# Patient Record
Sex: FEMALE | Race: WHITE | NOT HISPANIC OR LATINO | Employment: FULL TIME | URBAN - METROPOLITAN AREA
[De-identification: names, ages, dates, MRNs, and addresses within clinical notes are randomized per-mention and may not be internally consistent; named-entity substitution may affect disease eponyms.]

---

## 2017-07-25 LAB
EXTERNAL ABO GROUPING: NORMAL
EXTERNAL ANTIBODY SCREEN: NEGATIVE
EXTERNAL HEPATITIS B SURFACE ANTIGEN: NEGATIVE
EXTERNAL RH FACTOR: POSITIVE
EXTERNAL RUBELLA QUALITATIVE: NORMAL
EXTERNAL SYPHILIS RPR SCREEN: NEGATIVE
EXTERNAL URINE DRUG SCREEN: NEGATIVE
HIV1 P24 AG SERPL QL IA: NEGATIVE

## 2017-12-06 ENCOUNTER — LAB REQUISITION (OUTPATIENT)
Dept: LAB | Facility: HOSPITAL | Age: 27
End: 2017-12-06

## 2017-12-06 DIAGNOSIS — Z00.00 ROUTINE GENERAL MEDICAL EXAMINATION AT A HEALTH CARE FACILITY: ICD-10-CM

## 2017-12-06 DIAGNOSIS — Z34.00 ENCOUNTER FOR SUPERVISION OF NORMAL FIRST PREGNANCY: ICD-10-CM

## 2017-12-06 LAB
EXTERNAL GTT 1 HOUR: 93
GLUCOSE BLDC GLUCOMTR-MCNC: 117 MG/DL

## 2017-12-06 PROCEDURE — 82962 GLUCOSE BLOOD TEST: CPT | Performed by: NURSE PRACTITIONER

## 2017-12-06 PROCEDURE — 36415 COLL VENOUS BLD VENIPUNCTURE: CPT | Performed by: NURSE PRACTITIONER

## 2018-02-06 ENCOUNTER — TRANSCRIBE ORDERS (OUTPATIENT)
Dept: LAB | Facility: HOSPITAL | Age: 28
End: 2018-02-06

## 2018-02-06 ENCOUNTER — LAB (OUTPATIENT)
Dept: LAB | Facility: HOSPITAL | Age: 28
End: 2018-02-06

## 2018-02-06 DIAGNOSIS — Z34.83 PRENATAL CARE, SUBSEQUENT PREGNANCY, THIRD TRIMESTER: Primary | ICD-10-CM

## 2018-02-06 DIAGNOSIS — Z34.83 PRENATAL CARE, SUBSEQUENT PREGNANCY, THIRD TRIMESTER: ICD-10-CM

## 2018-02-06 LAB — EXTERNAL GROUP B STREP ANTIGEN: NEGATIVE

## 2018-02-06 PROCEDURE — 87081 CULTURE SCREEN ONLY: CPT

## 2018-02-08 ENCOUNTER — HOSPITAL ENCOUNTER (OUTPATIENT)
Facility: HOSPITAL | Age: 28
End: 2018-02-08
Attending: OBSTETRICS & GYNECOLOGY | Admitting: OBSTETRICS & GYNECOLOGY

## 2018-02-09 LAB — BACTERIA SPEC AEROBE CULT: NORMAL

## 2018-02-15 ENCOUNTER — HOSPITAL ENCOUNTER (OUTPATIENT)
Dept: LABOR AND DELIVERY | Facility: HOSPITAL | Age: 28
Discharge: HOME | End: 2018-02-15
Attending: OBSTETRICS & GYNECOLOGY | Admitting: OBSTETRICS & GYNECOLOGY

## 2018-02-15 VITALS
SYSTOLIC BLOOD PRESSURE: 133 MMHG | DIASTOLIC BLOOD PRESSURE: 83 MMHG | WEIGHT: 180 LBS | HEART RATE: 86 BPM | TEMPERATURE: 99.2 F | RESPIRATION RATE: 16 BRPM | BODY MASS INDEX: 33.13 KG/M2 | HEIGHT: 62 IN

## 2018-02-15 LAB
ABO GROUP BLD: NORMAL
ALP SERPL-CCNC: 175 U/L (ref 25–100)
ALT SERPL W P-5'-P-CCNC: 17 U/L (ref 7–40)
AST SERPL-CCNC: 17 U/L (ref 0–33)
BILIRUB SERPL-MCNC: 0.5 MG/DL (ref 0.3–1.2)
BLD GP AB SCN SERPL QL: NEGATIVE
CREAT BLD-MCNC: 0.6 MG/DL (ref 0.6–1.3)
DEPRECATED RDW RBC AUTO: 44.3 FL (ref 37–54)
ERYTHROCYTE [DISTWIDTH] IN BLOOD BY AUTOMATED COUNT: 14.2 % (ref 11.3–14.5)
HCT VFR BLD AUTO: 40.4 % (ref 34.5–44)
HGB BLD-MCNC: 13.6 G/DL (ref 11.5–15.5)
LDH SERPL-CCNC: 190 U/L (ref 120–246)
MCH RBC QN AUTO: 29.1 PG (ref 27–31)
MCHC RBC AUTO-ENTMCNC: 33.7 G/DL (ref 32–36)
MCV RBC AUTO: 86.3 FL (ref 80–99)
PLATELET # BLD AUTO: 238 10*3/MM3 (ref 150–450)
PMV BLD AUTO: 10.4 FL (ref 6–12)
RBC # BLD AUTO: 4.68 10*6/MM3 (ref 3.89–5.14)
RH BLD: POSITIVE
URATE SERPL-MCNC: 4.4 MG/DL (ref 3.1–7.8)
WBC NRBC COR # BLD: 11.6 10*3/MM3 (ref 3.5–10.8)

## 2018-02-15 PROCEDURE — 96372 THER/PROPH/DIAG INJ SC/IM: CPT

## 2018-02-15 PROCEDURE — G0378 HOSPITAL OBSERVATION PER HR: HCPCS

## 2018-02-15 PROCEDURE — 59412 ANTEPARTUM MANIPULATION: CPT

## 2018-02-15 PROCEDURE — 84460 ALANINE AMINO (ALT) (SGPT): CPT | Performed by: OBSTETRICS & GYNECOLOGY

## 2018-02-15 PROCEDURE — 86850 RBC ANTIBODY SCREEN: CPT | Performed by: OBSTETRICS & GYNECOLOGY

## 2018-02-15 PROCEDURE — 82247 BILIRUBIN TOTAL: CPT | Performed by: OBSTETRICS & GYNECOLOGY

## 2018-02-15 PROCEDURE — 25010000002 TERBUTALINE PER 1 MG: Performed by: OBSTETRICS & GYNECOLOGY

## 2018-02-15 PROCEDURE — 84450 TRANSFERASE (AST) (SGOT): CPT | Performed by: OBSTETRICS & GYNECOLOGY

## 2018-02-15 PROCEDURE — 82565 ASSAY OF CREATININE: CPT | Performed by: OBSTETRICS & GYNECOLOGY

## 2018-02-15 PROCEDURE — 86901 BLOOD TYPING SEROLOGIC RH(D): CPT | Performed by: OBSTETRICS & GYNECOLOGY

## 2018-02-15 PROCEDURE — 59025 FETAL NON-STRESS TEST: CPT

## 2018-02-15 PROCEDURE — 84075 ASSAY ALKALINE PHOSPHATASE: CPT | Performed by: OBSTETRICS & GYNECOLOGY

## 2018-02-15 PROCEDURE — 85027 COMPLETE CBC AUTOMATED: CPT | Performed by: OBSTETRICS & GYNECOLOGY

## 2018-02-15 PROCEDURE — 84550 ASSAY OF BLOOD/URIC ACID: CPT | Performed by: OBSTETRICS & GYNECOLOGY

## 2018-02-15 PROCEDURE — 83615 LACTATE (LD) (LDH) ENZYME: CPT | Performed by: OBSTETRICS & GYNECOLOGY

## 2018-02-15 PROCEDURE — 86900 BLOOD TYPING SEROLOGIC ABO: CPT | Performed by: OBSTETRICS & GYNECOLOGY

## 2018-02-15 RX ORDER — TERBUTALINE SULFATE 1 MG/ML
0.25 INJECTION, SOLUTION SUBCUTANEOUS ONCE
Status: COMPLETED | OUTPATIENT
Start: 2018-02-15 | End: 2018-02-15

## 2018-02-15 RX ORDER — SODIUM CHLORIDE, SODIUM LACTATE, POTASSIUM CHLORIDE, CALCIUM CHLORIDE 600; 310; 30; 20 MG/100ML; MG/100ML; MG/100ML; MG/100ML
125 INJECTION, SOLUTION INTRAVENOUS CONTINUOUS
Status: DISCONTINUED | OUTPATIENT
Start: 2018-02-15 | End: 2018-02-15 | Stop reason: HOSPADM

## 2018-02-15 RX ORDER — SODIUM CHLORIDE, SODIUM LACTATE, POTASSIUM CHLORIDE, CALCIUM CHLORIDE 600; 310; 30; 20 MG/100ML; MG/100ML; MG/100ML; MG/100ML
125 INJECTION, SOLUTION INTRAVENOUS CONTINUOUS
Status: DISCONTINUED | OUTPATIENT
Start: 2018-02-15 | End: 2018-02-15

## 2018-02-15 RX ADMIN — SODIUM CHLORIDE, POTASSIUM CHLORIDE, SODIUM LACTATE AND CALCIUM CHLORIDE 125 ML/HR: 600; 310; 30; 20 INJECTION, SOLUTION INTRAVENOUS at 09:35

## 2018-02-15 RX ADMIN — TERBUTALINE SULFATE 0.25 MG: 1 INJECTION SUBCUTANEOUS at 09:57

## 2018-02-15 NOTE — H&P
28 yo  37+ weeks ,breech presentation.  Options reviewed ;pt would like to try version .    Afeb VSS  Ht- RR  Lungs- Clear  Abd- soft nontender  Uterus- appropriate for gestational age     Prenatal record reviewed updated, contains H&P  A/ 37 weeks,breech   P/ external version   risk,benefits reviewed

## 2018-02-15 NOTE — DISCHARGE INSTRUCTIONS
External Cephalic Version  External cephalic version is turning a baby that is presenting his or her buttocks first (breech) or is lying sideways in the uterus (transverse) to a head-first position. This makes the labor and delivery faster, safer for the mother and baby, and lessens the chance for a  section. It should not be tried until the pregnancy is 36 weeks along or longer.  Before the procedure  · Do not take aspirin.  · Do not eat for 4 hours before the procedure.  · Tell your caregiver if you have a cold, fever, or an infection.  · Tell your caregiver if you are having contractions.  · Tell your caregiver if you are leaking or had a gush of fluid from your vagina.  · Tell your caregiver if you have any vaginal bleeding or abnormal discharge.  · If you are being admitted the same day, arrive at the hospital at least one hour before the procedure to sign any necessary documents and to get prepared for the procedure.  · Tell your caregiver if you had any problems with anesthetics in the past.  · Tell your caregiver if you are taking any medications that your caregiver does not know about. This includes over-the-counter and prescription drugs, herbs, eye drops and creams.  What happens during the procedure?  · First, an ultrasound is done to make sure the baby is breech or transverse.  · A non-stress test or biophysical profile is done on the baby before the ECV. This is done to make sure it is safe for the baby to have the ECV. It may also be done after the procedure to make sure the baby is okay.  · ECV is done in the delivery/surgical room with an anesthesiologist present. There should be a setup for an emergency  section with a full nursing and nursery staff available and ready.  · The patient may be given a medication to relax the uterine muscles. An epidural may be given for any discomfort. It is helpful for the success of the ECV.  · An electronic fetal monitor is placed on the uterus  during the procedure to make sure the baby is okay.  · If the mother is Rh-negative, Rho (D) immune globulin will be given to her to prevent Rh problems for future pregnancies.  · The mother is followed closely for 2 to 3 hours after the procedure to make sure no problems develop.  Benefits of ECV  · Easier and safer labor and delivery for the mother and baby.  · Lower incidence of  section.  · Lower costs with a vaginal delivery.  Risks of ECV  · The placenta pulls away from the wall of the uterus before delivery (abruption of the placenta).  · Rupture of the uterus, especially in patients with a previous  section.  · Fetal distress.  · Early (premature) labor.  · Premature rupture of the membranes.  · The baby will return to the breech or transverse lie position.  · Death of the fetus can happen but is very rare.  ECV should be stopped if:  · The fetal heart tones drop.  · The mother is having a lot of pain.  · You cannot turn the baby after several attempts.  ECV should not be done if:  · The non-stress test or biophysical profile is abnormal.  · There is vaginal bleeding.  · An abnormal shaped uterus is present.  · There is heart disease or uncontrolled high blood pressure in the mother.  · There are twins or more.  · The placenta covers the opening of the cervix (placenta previa).  · You had a previous  section with a classical incision or major surgery of the uterus.  · There is not enough amniotic fluid in the sac (oligohydramnios).  · The baby is too small for the pregnancy or has not developed normally (anomaly).  · Your membranes have ruptured.  Follow these instructions at home:  · Have someone take you home after the procedure.  · Rest at home for several hours.  · Have someone stay with you for a few hours after you get home.  · After ECV, continue with your prenatal visits as directed.  · Continue your regular diet, rest and activities.  · Do not do any strenuous activities for  a couple of days.  Get help right away if:  · You develop vaginal bleeding.  · You have fluid coming out of your vagina (bag of water may have broken).  · You develop uterine contractions.  · You do not feel the baby move or there is less movement of the baby.  · You develop abdominal pain.  · You develop an oral temperature of 102°F (38.9°C) or higher.  This information is not intended to replace advice given to you by your health care provider. Make sure you discuss any questions you have with your health care provider.  Document Released: 06/11/2008 Document Revised: 05/31/2017 Document Reviewed: 12/31/2016  [employer] Interactive Patient Education © 2017 [employer]

## 2018-02-15 NOTE — POST-PROCEDURE NOTE
preop- dx -breech presentation at 37 weeks    NST reactive    US confirms breech presentation    Version attempt successful, forwad flip with mild to moderate pressure    NST after reactive ,will stay 2 hours

## 2018-02-22 ENCOUNTER — HOSPITAL ENCOUNTER (OUTPATIENT)
Facility: HOSPITAL | Age: 28
Discharge: HOME | End: 2018-02-22
Attending: OBSTETRICS & GYNECOLOGY | Admitting: OBSTETRICS & GYNECOLOGY

## 2018-02-22 VITALS
DIASTOLIC BLOOD PRESSURE: 81 MMHG | WEIGHT: 182.6 LBS | RESPIRATION RATE: 18 BRPM | SYSTOLIC BLOOD PRESSURE: 134 MMHG | BODY MASS INDEX: 33.6 KG/M2 | HEART RATE: 68 BPM | HEIGHT: 62 IN | TEMPERATURE: 98.3 F

## 2018-02-22 PROCEDURE — G0463 HOSPITAL OUTPT CLINIC VISIT: HCPCS

## 2018-02-22 PROCEDURE — 59025 FETAL NON-STRESS TEST: CPT

## 2018-02-22 RX ORDER — PRENATAL WITH FERROUS FUM AND FOLIC ACID 3080; 920; 120; 400; 22; 1.84; 3; 20; 10; 1; 12; 200; 27; 25; 2 [IU]/1; [IU]/1; MG/1; [IU]/1; MG/1; MG/1; MG/1; MG/1; MG/1; MG/1; UG/1; MG/1; MG/1; MG/1; MG/1
1 TABLET ORAL DAILY
COMMUNITY
End: 2022-07-20

## 2018-03-05 ENCOUNTER — HOSPITAL ENCOUNTER (INPATIENT)
Facility: HOSPITAL | Age: 28
LOS: 3 days | Discharge: HOME | End: 2018-03-09
Attending: OBSTETRICS & GYNECOLOGY | Admitting: OBSTETRICS & GYNECOLOGY

## 2018-03-05 RX ADMIN — SODIUM CHLORIDE, POTASSIUM CHLORIDE, SODIUM LACTATE AND CALCIUM CHLORIDE 125 ML/HR: 600; 310; 30; 20 INJECTION, SOLUTION INTRAVENOUS at 23:50

## 2018-03-06 ENCOUNTER — ANESTHESIA EVENT (OUTPATIENT)
Dept: LABOR AND DELIVERY | Facility: HOSPITAL | Age: 28
End: 2018-03-06

## 2018-03-06 ENCOUNTER — ANESTHESIA (OUTPATIENT)
Dept: LABOR AND DELIVERY | Facility: HOSPITAL | Age: 28
End: 2018-03-06

## 2018-03-06 LAB
ABO GROUP BLD: NORMAL
ALP SERPL-CCNC: 186 U/L (ref 25–100)
ALT SERPL W P-5'-P-CCNC: 23 U/L (ref 7–40)
AST SERPL-CCNC: 20 U/L (ref 0–33)
ATMOSPHERIC PRESS: ABNORMAL MMHG
ATMOSPHERIC PRESS: ABNORMAL MMHG
BASE EXCESS BLDCOA CALC-SCNC: -5.2 MMOL/L (ref 0–2)
BASE EXCESS BLDCOV CALC-SCNC: -5.2 MMOL/L (ref 0–2)
BDY SITE: ABNORMAL
BILIRUB SERPL-MCNC: 0.4 MG/DL (ref 0.3–1.2)
BLD GP AB SCN SERPL QL: NEGATIVE
CO2 BLDA-SCNC: 21.7 MMOL/L (ref 22–33)
CO2 BLDA-SCNC: 26.1 MMOL/L (ref 22–33)
CREAT BLD-MCNC: 0.6 MG/DL (ref 0.6–1.3)
DEPRECATED RDW RBC AUTO: 47.5 FL (ref 37–54)
ERYTHROCYTE [DISTWIDTH] IN BLOOD BY AUTOMATED COUNT: 15 % (ref 11.3–14.5)
HCO3 BLDCOA-SCNC: 24.3 MMOL/L (ref 16.9–20.5)
HCO3 BLDCOV-SCNC: 20.5 MMOL/L (ref 18.6–21.4)
HCT VFR BLD AUTO: 38.1 % (ref 34.5–44)
HGB BLD-MCNC: 12.7 G/DL (ref 11.5–15.5)
HGB BLDA-MCNC: 17.3 G/DL (ref 14–18)
HGB BLDA-MCNC: 18.2 G/DL (ref 14–18)
HOROWITZ INDEX BLD+IHG-RTO: 21 %
HOROWITZ INDEX BLD+IHG-RTO: 21 %
LDH SERPL-CCNC: 204 U/L (ref 120–246)
MCH RBC QN AUTO: 29 PG (ref 27–31)
MCHC RBC AUTO-ENTMCNC: 33.3 G/DL (ref 32–36)
MCV RBC AUTO: 87 FL (ref 80–99)
MODALITY: ABNORMAL
MODALITY: ABNORMAL
PCO2 BLDCOA: 60.7 MMHG (ref 43.3–54.9)
PCO2 BLDCOV: 39.6 MM HG (ref 30–60)
PH BLDCOA: 7.21 PH UNITS (ref 7.2–7.3)
PH BLDCOV: 7.32 PH UNITS (ref 7.19–7.46)
PLATELET # BLD AUTO: 214 10*3/MM3 (ref 150–450)
PMV BLD AUTO: 10.8 FL (ref 6–12)
PO2 BLDCOA: 11.4 MMHG (ref 11.5–43.3)
PO2 BLDCOV: 29.3 MM HG
RBC # BLD AUTO: 4.38 10*6/MM3 (ref 3.89–5.14)
RH BLD: POSITIVE
SAO2 % BLDCOA: 12.1 %
SAO2 % BLDCOA: ABNORMAL % (ref 45–75)
SAO2 % BLDCOV: 61.8 %
URATE SERPL-MCNC: 5.3 MG/DL (ref 3.1–7.8)
WBC NRBC COR # BLD: 12.95 10*3/MM3 (ref 3.5–10.8)

## 2018-03-06 PROCEDURE — 84450 TRANSFERASE (AST) (SGOT): CPT | Performed by: OBSTETRICS & GYNECOLOGY

## 2018-03-06 PROCEDURE — 83615 LACTATE (LD) (LDH) ENZYME: CPT | Performed by: OBSTETRICS & GYNECOLOGY

## 2018-03-06 PROCEDURE — 84075 ASSAY ALKALINE PHOSPHATASE: CPT | Performed by: OBSTETRICS & GYNECOLOGY

## 2018-03-06 PROCEDURE — 25010000002 ONDANSETRON PER 1 MG: Performed by: OBSTETRICS & GYNECOLOGY

## 2018-03-06 PROCEDURE — 82805 BLOOD GASES W/O2 SATURATION: CPT | Performed by: OBSTETRICS & GYNECOLOGY

## 2018-03-06 PROCEDURE — 82247 BILIRUBIN TOTAL: CPT | Performed by: OBSTETRICS & GYNECOLOGY

## 2018-03-06 PROCEDURE — 0UB60ZZ EXCISION OF LEFT FALLOPIAN TUBE, OPEN APPROACH: ICD-10-PCS | Performed by: SPECIALIST

## 2018-03-06 PROCEDURE — 25010000003 CEFAZOLIN IN DEXTROSE 2-4 GM/100ML-% SOLUTION: Performed by: OBSTETRICS & GYNECOLOGY

## 2018-03-06 PROCEDURE — 85027 COMPLETE CBC AUTOMATED: CPT | Performed by: OBSTETRICS & GYNECOLOGY

## 2018-03-06 PROCEDURE — 84460 ALANINE AMINO (ALT) (SGPT): CPT | Performed by: OBSTETRICS & GYNECOLOGY

## 2018-03-06 PROCEDURE — 86901 BLOOD TYPING SEROLOGIC RH(D): CPT | Performed by: OBSTETRICS & GYNECOLOGY

## 2018-03-06 PROCEDURE — 84550 ASSAY OF BLOOD/URIC ACID: CPT | Performed by: OBSTETRICS & GYNECOLOGY

## 2018-03-06 PROCEDURE — 82565 ASSAY OF CREATININE: CPT | Performed by: OBSTETRICS & GYNECOLOGY

## 2018-03-06 PROCEDURE — 88304 TISSUE EXAM BY PATHOLOGIST: CPT | Performed by: OBSTETRICS & GYNECOLOGY

## 2018-03-06 PROCEDURE — C1755 CATHETER, INTRASPINAL: HCPCS | Performed by: ANESTHESIOLOGY

## 2018-03-06 PROCEDURE — 25010000002 ROPIVACAINE PER 1 MG: Performed by: ANESTHESIOLOGY

## 2018-03-06 PROCEDURE — C1755 CATHETER, INTRASPINAL: HCPCS

## 2018-03-06 PROCEDURE — 59025 FETAL NON-STRESS TEST: CPT

## 2018-03-06 PROCEDURE — 25010000003 MORPHINE PER 10 MG: Performed by: NURSE ANESTHETIST, CERTIFIED REGISTERED

## 2018-03-06 PROCEDURE — 86900 BLOOD TYPING SEROLOGIC ABO: CPT | Performed by: OBSTETRICS & GYNECOLOGY

## 2018-03-06 PROCEDURE — 86850 RBC ANTIBODY SCREEN: CPT | Performed by: OBSTETRICS & GYNECOLOGY

## 2018-03-06 PROCEDURE — 25010000002 FENTANYL CITRATE (PF) 100 MCG/2ML SOLUTION: Performed by: ANESTHESIOLOGY

## 2018-03-06 RX ORDER — NALOXONE HCL 0.4 MG/ML
0.4 VIAL (ML) INJECTION
Status: ACTIVE | OUTPATIENT
Start: 2018-03-06 | End: 2018-03-07

## 2018-03-06 RX ORDER — OXYCODONE AND ACETAMINOPHEN 7.5; 325 MG/1; MG/1
1 TABLET ORAL EVERY 4 HOURS PRN
Status: DISCONTINUED | OUTPATIENT
Start: 2018-03-06 | End: 2018-03-09 | Stop reason: HOSPADM

## 2018-03-06 RX ORDER — OXYTOCIN/RINGER'S LACTATE 20/1000 ML
125 PLASTIC BAG, INJECTION (ML) INTRAVENOUS CONTINUOUS PRN
Status: DISCONTINUED | OUTPATIENT
Start: 2018-03-06 | End: 2018-03-06 | Stop reason: HOSPADM

## 2018-03-06 RX ORDER — IBUPROFEN 600 MG/1
600 TABLET ORAL EVERY 6 HOURS PRN
Status: DISCONTINUED | OUTPATIENT
Start: 2018-03-06 | End: 2018-03-09 | Stop reason: HOSPADM

## 2018-03-06 RX ORDER — ONDANSETRON 2 MG/ML
4 INJECTION INTRAMUSCULAR; INTRAVENOUS EVERY 6 HOURS PRN
Status: DISCONTINUED | OUTPATIENT
Start: 2018-03-06 | End: 2018-03-06 | Stop reason: HOSPADM

## 2018-03-06 RX ORDER — MISOPROSTOL 200 UG/1
800 TABLET ORAL AS NEEDED
Status: DISCONTINUED | OUTPATIENT
Start: 2018-03-06 | End: 2018-03-09 | Stop reason: HOSPADM

## 2018-03-06 RX ORDER — FENTANYL CITRATE 50 UG/ML
INJECTION, SOLUTION INTRAMUSCULAR; INTRAVENOUS AS NEEDED
Status: DISCONTINUED | OUTPATIENT
Start: 2018-03-06 | End: 2018-03-06 | Stop reason: SURG

## 2018-03-06 RX ORDER — TRISODIUM CITRATE DIHYDRATE AND CITRIC ACID MONOHYDRATE 500; 334 MG/5ML; MG/5ML
30 SOLUTION ORAL ONCE
Status: COMPLETED | OUTPATIENT
Start: 2018-03-06 | End: 2018-03-06

## 2018-03-06 RX ORDER — LIDOCAINE HYDROCHLORIDE 10 MG/ML
5 INJECTION, SOLUTION EPIDURAL; INFILTRATION; INTRACAUDAL; PERINEURAL AS NEEDED
Status: DISCONTINUED | OUTPATIENT
Start: 2018-03-06 | End: 2018-03-06 | Stop reason: HOSPADM

## 2018-03-06 RX ORDER — LIDOCAINE HYDROCHLORIDE AND EPINEPHRINE 20; 5 MG/ML; UG/ML
INJECTION, SOLUTION EPIDURAL; INFILTRATION; INTRACAUDAL; PERINEURAL AS NEEDED
Status: DISCONTINUED | OUTPATIENT
Start: 2018-03-06 | End: 2018-03-06 | Stop reason: SURG

## 2018-03-06 RX ORDER — DOCUSATE SODIUM 100 MG/1
100 CAPSULE, LIQUID FILLED ORAL 2 TIMES DAILY PRN
Status: DISCONTINUED | OUTPATIENT
Start: 2018-03-06 | End: 2018-03-09 | Stop reason: HOSPADM

## 2018-03-06 RX ORDER — METHYLERGONOVINE MALEATE 0.2 MG/ML
200 INJECTION INTRAVENOUS ONCE AS NEEDED
Status: DISCONTINUED | OUTPATIENT
Start: 2018-03-06 | End: 2018-03-06 | Stop reason: HOSPADM

## 2018-03-06 RX ORDER — MORPHINE SULFATE 0.5 MG/ML
INJECTION, SOLUTION EPIDURAL; INTRATHECAL; INTRAVENOUS AS NEEDED
Status: DISCONTINUED | OUTPATIENT
Start: 2018-03-06 | End: 2018-03-06 | Stop reason: SURG

## 2018-03-06 RX ORDER — IBUPROFEN 600 MG/1
600 TABLET ORAL ONCE AS NEEDED
Status: DISCONTINUED | OUTPATIENT
Start: 2018-03-06 | End: 2018-03-09 | Stop reason: HOSPADM

## 2018-03-06 RX ORDER — ACETAMINOPHEN 325 MG/1
650 TABLET ORAL ONCE AS NEEDED
Status: DISCONTINUED | OUTPATIENT
Start: 2018-03-06 | End: 2018-03-09 | Stop reason: HOSPADM

## 2018-03-06 RX ORDER — OXYTOCIN/RINGER'S LACTATE 20/1000 ML
999 PLASTIC BAG, INJECTION (ML) INTRAVENOUS ONCE
Status: DISCONTINUED | OUTPATIENT
Start: 2018-03-06 | End: 2018-03-06 | Stop reason: HOSPADM

## 2018-03-06 RX ORDER — CARBOPROST TROMETHAMINE 250 UG/ML
250 INJECTION, SOLUTION INTRAMUSCULAR AS NEEDED
Status: DISCONTINUED | OUTPATIENT
Start: 2018-03-06 | End: 2018-03-09 | Stop reason: HOSPADM

## 2018-03-06 RX ORDER — ONDANSETRON 4 MG/1
4 TABLET, FILM COATED ORAL EVERY 8 HOURS PRN
Status: DISCONTINUED | OUTPATIENT
Start: 2018-03-06 | End: 2018-03-09 | Stop reason: HOSPADM

## 2018-03-06 RX ORDER — HYDROMORPHONE HYDROCHLORIDE 1 MG/ML
0.5 INJECTION, SOLUTION INTRAMUSCULAR; INTRAVENOUS; SUBCUTANEOUS
Status: DISCONTINUED | OUTPATIENT
Start: 2018-03-06 | End: 2018-03-06 | Stop reason: HOSPADM

## 2018-03-06 RX ORDER — METOCLOPRAMIDE HYDROCHLORIDE 5 MG/ML
10 INJECTION INTRAMUSCULAR; INTRAVENOUS ONCE AS NEEDED
Status: DISCONTINUED | OUTPATIENT
Start: 2018-03-06 | End: 2018-03-06 | Stop reason: HOSPADM

## 2018-03-06 RX ORDER — NIFEDIPINE 30 MG/1
30 TABLET, EXTENDED RELEASE ORAL ONCE
Status: COMPLETED | OUTPATIENT
Start: 2018-03-06 | End: 2018-03-06

## 2018-03-06 RX ORDER — CEFAZOLIN SODIUM 2 G/100ML
2 INJECTION, SOLUTION INTRAVENOUS ONCE
Status: COMPLETED | OUTPATIENT
Start: 2018-03-06 | End: 2018-03-06

## 2018-03-06 RX ORDER — LIDOCAINE HYDROCHLORIDE AND EPINEPHRINE 15; 5 MG/ML; UG/ML
INJECTION, SOLUTION EPIDURAL AS NEEDED
Status: DISCONTINUED | OUTPATIENT
Start: 2018-03-06 | End: 2018-03-06 | Stop reason: SURG

## 2018-03-06 RX ORDER — SODIUM CHLORIDE, SODIUM LACTATE, POTASSIUM CHLORIDE, CALCIUM CHLORIDE 600; 310; 30; 20 MG/100ML; MG/100ML; MG/100ML; MG/100ML
125 INJECTION, SOLUTION INTRAVENOUS CONTINUOUS
Status: DISCONTINUED | OUTPATIENT
Start: 2018-03-06 | End: 2018-03-06

## 2018-03-06 RX ORDER — ONDANSETRON 2 MG/ML
4 INJECTION INTRAMUSCULAR; INTRAVENOUS ONCE
Status: DISCONTINUED | OUTPATIENT
Start: 2018-03-06 | End: 2018-03-09 | Stop reason: HOSPADM

## 2018-03-06 RX ORDER — METHYLERGONOVINE MALEATE 0.2 MG/ML
200 INJECTION INTRAVENOUS ONCE AS NEEDED
Status: DISCONTINUED | OUTPATIENT
Start: 2018-03-06 | End: 2018-03-09 | Stop reason: HOSPADM

## 2018-03-06 RX ORDER — OXYTOCIN 10 [USP'U]/ML
INJECTION, SOLUTION INTRAMUSCULAR; INTRAVENOUS AS NEEDED
Status: DISCONTINUED | OUTPATIENT
Start: 2018-03-06 | End: 2018-03-06 | Stop reason: SURG

## 2018-03-06 RX ORDER — NIFEDIPINE 10 MG/1
10 CAPSULE ORAL ONCE
Status: COMPLETED | OUTPATIENT
Start: 2018-03-06 | End: 2018-03-06

## 2018-03-06 RX ORDER — ROPIVACAINE HYDROCHLORIDE 5 MG/ML
INJECTION, SOLUTION EPIDURAL; INFILTRATION; PERINEURAL AS NEEDED
Status: DISCONTINUED | OUTPATIENT
Start: 2018-03-06 | End: 2018-03-06 | Stop reason: SURG

## 2018-03-06 RX ORDER — CEFAZOLIN SODIUM 2 G/100ML
2 INJECTION, SOLUTION INTRAVENOUS ONCE
Status: DISCONTINUED | OUTPATIENT
Start: 2018-03-06 | End: 2018-03-06 | Stop reason: SDUPTHER

## 2018-03-06 RX ORDER — SODIUM CHLORIDE, SODIUM LACTATE, POTASSIUM CHLORIDE, CALCIUM CHLORIDE 600; 310; 30; 20 MG/100ML; MG/100ML; MG/100ML; MG/100ML
125 INJECTION, SOLUTION INTRAVENOUS CONTINUOUS
Status: DISCONTINUED | OUTPATIENT
Start: 2018-03-06 | End: 2018-03-09 | Stop reason: HOSPADM

## 2018-03-06 RX ORDER — ACETAMINOPHEN 325 MG/1
650 TABLET ORAL ONCE AS NEEDED
Status: DISCONTINUED | OUTPATIENT
Start: 2018-03-06 | End: 2018-03-06 | Stop reason: HOSPADM

## 2018-03-06 RX ORDER — SODIUM CHLORIDE, SODIUM LACTATE, POTASSIUM CHLORIDE, CALCIUM CHLORIDE 600; 310; 30; 20 MG/100ML; MG/100ML; MG/100ML; MG/100ML
100 INJECTION, SOLUTION INTRAVENOUS CONTINUOUS
Status: DISCONTINUED | OUTPATIENT
Start: 2018-03-06 | End: 2018-03-09 | Stop reason: HOSPADM

## 2018-03-06 RX ORDER — DIPHENHYDRAMINE HYDROCHLORIDE 50 MG/ML
12.5 INJECTION INTRAMUSCULAR; INTRAVENOUS EVERY 8 HOURS PRN
Status: DISCONTINUED | OUTPATIENT
Start: 2018-03-06 | End: 2018-03-06 | Stop reason: HOSPADM

## 2018-03-06 RX ORDER — OXYCODONE AND ACETAMINOPHEN 10; 325 MG/1; MG/1
1 TABLET ORAL EVERY 4 HOURS PRN
Status: DISCONTINUED | OUTPATIENT
Start: 2018-03-06 | End: 2018-03-09 | Stop reason: HOSPADM

## 2018-03-06 RX ORDER — ROPIVACAINE HYDROCHLORIDE 2 MG/ML
14 INJECTION, SOLUTION EPIDURAL; INFILTRATION; PERINEURAL CONTINUOUS
Status: DISCONTINUED | OUTPATIENT
Start: 2018-03-06 | End: 2018-03-09 | Stop reason: HOSPADM

## 2018-03-06 RX ORDER — OXYTOCIN/RINGER'S LACTATE 20/1000 ML
999 PLASTIC BAG, INJECTION (ML) INTRAVENOUS ONCE
Status: DISCONTINUED | OUTPATIENT
Start: 2018-03-06 | End: 2018-03-09 | Stop reason: HOSPADM

## 2018-03-06 RX ORDER — EPHEDRINE SULFATE 50 MG/ML
5 INJECTION, SOLUTION INTRAVENOUS
Status: DISCONTINUED | OUTPATIENT
Start: 2018-03-06 | End: 2018-03-06 | Stop reason: HOSPADM

## 2018-03-06 RX ORDER — OXYTOCIN/RINGER'S LACTATE 20/1000 ML
125 PLASTIC BAG, INJECTION (ML) INTRAVENOUS CONTINUOUS PRN
Status: DISCONTINUED | OUTPATIENT
Start: 2018-03-06 | End: 2018-03-09 | Stop reason: HOSPADM

## 2018-03-06 RX ORDER — ONDANSETRON 4 MG/1
4 TABLET, FILM COATED ORAL EVERY 6 HOURS PRN
Status: DISCONTINUED | OUTPATIENT
Start: 2018-03-06 | End: 2018-03-06 | Stop reason: HOSPADM

## 2018-03-06 RX ORDER — SODIUM CHLORIDE 0.9 % (FLUSH) 0.9 %
1-10 SYRINGE (ML) INJECTION AS NEEDED
Status: DISCONTINUED | OUTPATIENT
Start: 2018-03-06 | End: 2018-03-06 | Stop reason: HOSPADM

## 2018-03-06 RX ORDER — MAGNESIUM CARB/ALUMINUM HYDROX 105-160MG
30 TABLET,CHEWABLE ORAL ONCE
Status: DISCONTINUED | OUTPATIENT
Start: 2018-03-06 | End: 2018-03-06 | Stop reason: HOSPADM

## 2018-03-06 RX ORDER — METOCLOPRAMIDE HYDROCHLORIDE 5 MG/ML
10 INJECTION INTRAMUSCULAR; INTRAVENOUS ONCE AS NEEDED
Status: DISCONTINUED | OUTPATIENT
Start: 2018-03-06 | End: 2018-03-09 | Stop reason: HOSPADM

## 2018-03-06 RX ORDER — HYDROMORPHONE HYDROCHLORIDE 1 MG/ML
0.5 INJECTION, SOLUTION INTRAMUSCULAR; INTRAVENOUS; SUBCUTANEOUS
Status: ACTIVE | OUTPATIENT
Start: 2018-03-06 | End: 2018-03-07

## 2018-03-06 RX ORDER — OXYCODONE AND ACETAMINOPHEN 7.5; 325 MG/1; MG/1
1 TABLET ORAL ONCE AS NEEDED
Status: COMPLETED | OUTPATIENT
Start: 2018-03-06 | End: 2018-03-06

## 2018-03-06 RX ORDER — OXYTOCIN-SODIUM CHLORIDE 0.9% IV SOLN 30 UNIT/500ML 30-0.9/5 UT/ML-%
2-24 SOLUTION INTRAVENOUS
Status: DISCONTINUED | OUTPATIENT
Start: 2018-03-06 | End: 2018-03-06 | Stop reason: HOSPADM

## 2018-03-06 RX ORDER — ONDANSETRON 2 MG/ML
4 INJECTION INTRAMUSCULAR; INTRAVENOUS ONCE
Status: DISCONTINUED | OUTPATIENT
Start: 2018-03-06 | End: 2018-03-06 | Stop reason: HOSPADM

## 2018-03-06 RX ADMIN — LIDOCAINE HYDROCHLORIDE AND EPINEPHRINE 3 ML: 15; 5 INJECTION, SOLUTION EPIDURAL at 08:15

## 2018-03-06 RX ADMIN — OXYTOCIN 2 MILLI-UNITS/MIN: 10 INJECTION INTRAVENOUS at 06:25

## 2018-03-06 RX ADMIN — MORPHINE SULFATE 3 MG: 0.5 INJECTION, SOLUTION EPIDURAL; INTRATHECAL; INTRAVENOUS at 12:47

## 2018-03-06 RX ADMIN — OXYTOCIN 40 UNITS: 10 INJECTION, SOLUTION INTRAMUSCULAR; INTRAVENOUS at 13:00

## 2018-03-06 RX ADMIN — SODIUM CHLORIDE, POTASSIUM CHLORIDE, SODIUM LACTATE AND CALCIUM CHLORIDE 1000 ML: 600; 310; 30; 20 INJECTION, SOLUTION INTRAVENOUS at 12:02

## 2018-03-06 RX ADMIN — LIDOCAINE HYDROCHLORIDE,EPINEPHRINE BITARTRATE 10 ML: 20; .005 INJECTION, SOLUTION EPIDURAL; INFILTRATION; INTRACAUDAL; PERINEURAL at 12:08

## 2018-03-06 RX ADMIN — OXYCODONE HYDROCHLORIDE AND ACETAMINOPHEN 1 TABLET: 7.5; 325 TABLET ORAL at 13:56

## 2018-03-06 RX ADMIN — EPHEDRINE SULFATE 10 MG: 50 INJECTION, SOLUTION INTRAVENOUS at 10:47

## 2018-03-06 RX ADMIN — SODIUM CHLORIDE, POTASSIUM CHLORIDE, SODIUM LACTATE AND CALCIUM CHLORIDE 100 ML/HR: 600; 310; 30; 20 INJECTION, SOLUTION INTRAVENOUS at 09:26

## 2018-03-06 RX ADMIN — NIFEDIPINE 10 MG: 10 CAPSULE, LIQUID FILLED ORAL at 14:29

## 2018-03-06 RX ADMIN — ROPIVACAINE HYDROCHLORIDE 14 ML/HR: 2 INJECTION, SOLUTION EPIDURAL; INFILTRATION at 08:15

## 2018-03-06 RX ADMIN — FENTANYL CITRATE 100 MCG: 50 INJECTION, SOLUTION INTRAMUSCULAR; INTRAVENOUS at 08:15

## 2018-03-06 RX ADMIN — ONDANSETRON 4 MG: 2 INJECTION INTRAMUSCULAR; INTRAVENOUS at 12:22

## 2018-03-06 RX ADMIN — NIFEDIPINE 30 MG: 30 TABLET, FILM COATED, EXTENDED RELEASE ORAL at 14:29

## 2018-03-06 RX ADMIN — SODIUM CHLORIDE, POTASSIUM CHLORIDE, SODIUM LACTATE AND CALCIUM CHLORIDE 125 ML/HR: 600; 310; 30; 20 INJECTION, SOLUTION INTRAVENOUS at 03:32

## 2018-03-06 RX ADMIN — LIDOCAINE HYDROCHLORIDE,EPINEPHRINE BITARTRATE 5 ML: 20; .005 INJECTION, SOLUTION EPIDURAL; INFILTRATION; INTRACAUDAL; PERINEURAL at 12:18

## 2018-03-06 RX ADMIN — OXYCODONE HYDROCHLORIDE AND ACETAMINOPHEN 1 TABLET: 7.5; 325 TABLET ORAL at 22:57

## 2018-03-06 RX ADMIN — CEFAZOLIN SODIUM 2 G: 2 INJECTION, SOLUTION INTRAVENOUS at 12:15

## 2018-03-06 RX ADMIN — ROPIVACAINE HYDROCHLORIDE 10 ML: 5 INJECTION, SOLUTION EPIDURAL; INFILTRATION; PERINEURAL at 08:15

## 2018-03-06 RX ADMIN — ONDANSETRON 4 MG: 4 TABLET, FILM COATED ORAL at 18:54

## 2018-03-06 RX ADMIN — SODIUM CITRATE AND CITRIC ACID MONOHYDRATE 30 ML: 500; 334 SOLUTION ORAL at 12:12

## 2018-03-06 RX ADMIN — OXYTOCIN 125 ML/HR: 10 INJECTION INTRAVENOUS at 14:31

## 2018-03-06 RX ADMIN — SODIUM CHLORIDE, POTASSIUM CHLORIDE, SODIUM LACTATE AND CALCIUM CHLORIDE 200 ML: 600; 310; 30; 20 INJECTION, SOLUTION INTRAVENOUS at 08:42

## 2018-03-06 RX ADMIN — EPHEDRINE SULFATE 10 MG: 50 INJECTION, SOLUTION INTRAVENOUS at 09:22

## 2018-03-06 RX ADMIN — MORPHINE SULFATE 2 MG: 0.5 INJECTION, SOLUTION EPIDURAL; INTRATHECAL; INTRAVENOUS at 12:57

## 2018-03-06 RX ADMIN — OXYTOCIN 30 UNITS: 10 INJECTION, SOLUTION INTRAMUSCULAR; INTRAVENOUS at 12:43

## 2018-03-06 RX ADMIN — SODIUM CHLORIDE, POTASSIUM CHLORIDE, SODIUM LACTATE AND CALCIUM CHLORIDE 125 ML/HR: 600; 310; 30; 20 INJECTION, SOLUTION INTRAVENOUS at 08:25

## 2018-03-06 NOTE — ANESTHESIA PROCEDURE NOTES
Labor Epidural    Patient location during procedure: OB  Start Time: 3/6/2018 8:05 AM  Performed By  Anesthesiologist: CARMELO TINOCO  Preanesthetic Checklist  Completed: patient identified, site marked, surgical consent, pre-op evaluation, timeout performed, risks and benefits discussed and monitors and equipment checked  Prep:  Pt Position:sitting  Sterile Tech:cap, gloves, mask and sterile barrier  Prep:alcohol swabs  Monitoring:blood pressure monitoring  Epidural Block Procedure:  Approach:midline  Guidance:landmark technique  Location:L3-L4  Needle Type:Tuohy  Needle Gauge:17 G  Loss of Resistance Medium: air  Loss of Resistance: 5cm  Cath Depth at skin:10 cm  Paresthesia: none  Aspiration:negative  Test Dose:negative  Number of Attempts: 3  Post Assessment:  Dressing:occlusive dressing applied and secured with tape  Pt Tolerance:patient tolerated the procedure well with no apparent complications  Complications:no

## 2018-03-06 NOTE — H&P
"History and Physical:    Subjective     Chief Complaint   Patient presents with   • Rupture of Membranes       Thomas Noriega is a 27 y.o. year old  with an Estimated Date of Delivery: 3/8/18 currently at 39w5d presenting with leaking since 11PM.    Prenatal care has been with Colby Grier MD.  It has been significant for No Complications.        Review of Systems  Pertinent items are noted in HPI.     Past Medical History:   Diagnosis Date   • Pituitary microadenoma 2017    benign   • Pituitary microadenoma      Past Surgical History:   Procedure Laterality Date   • WISDOM TOOTH EXTRACTION       No family history on file.  Social History   Substance Use Topics   • Smoking status: Never Smoker   • Smokeless tobacco: Never Used   • Alcohol use No     Prescriptions Prior to Admission   Medication Sig Dispense Refill Last Dose   • Prenatal Vit-Fe Fumarate-FA (PRENATAL ) 27-1 MG tablet tablet Take 1 tablet by mouth Daily.   3/4/2018 at Unknown time     Allergies:  Review of patient's allergies indicates no known allergies.  OB History    Para Term  AB Living   1        SAB TAB Ectopic Multiple Live Births             # Outcome Date GA Lbr Al/2nd Weight Sex Delivery Anes PTL Lv   1 Current                     Objective     /91  Pulse 76  Temp 98.5 °F (36.9 °C) (Oral)   Resp 18  Ht 157.5 cm (62\")  Breastfeeding? Yes    Physical Exam    General:  No acute distress           Abdomen: Gravid, nontender       FHT's: reactive    Cervix: 4 per nurse   [place]: Contraction are q3     Lab Review   External Prenatal Results         Pregnancy Outside Results - these were transcribed from office records.  See scanned records for details. Date Time   Hgb      Hct      ABO ^ A  17    Rh ^ Positive  17    Antibody Screen ^ Negative  17    Glucose Fasting GTT      Glucose Tolerance Test 1 hour ^ 93  17    Glucose Tolerance Test 3 hour      Gonorrhea (discrete)    "   Chlamydia (discrete)      RPR ^ Negative  07/25/17    VDRL      Syphillis Antibody      Rubella ^ Immune  07/25/17    HBsAg ^ Negative  07/25/17    Herpes Simplex Virus PCR      Herpes Simplex VIrus Culture      HIV ^ Negative  07/25/17    Hep C RNA Quant PCR      Hep C Antibody      Urine Drug Screen ^ Negative  07/25/17    AFP      Group B Strep ^ Negative  02/06/18    GBS Susceptibility to Clindamycin      GBS Susceptibility to Eythromycin      Fetal Fibronectin      Genetic Testing, Maternal Blood             Legend: ^: Historical              Assessment/Plan     ASSESSMENT  1. IUP at 39w5d  2. labor and rupture of membranes   3. GBBSnegative  PLAN  1. Admit to labor and delivery   2.  pitocin augmentation  Addendum;   pt has been 5/100/-1 for 3-4 hours,FHT with sig  Variable decels    Plan DC pitocin , Primary C/S for FTP and nonreassuring FHR    Meghan Garrison MD  3/6/2018@

## 2018-03-06 NOTE — NURSING NOTE
RN called and discussed patient's BP range with Dr. Grier.  Dr. Grier gave new orders and requested patient be watched for additional hour.     1600- RN updated Dr. Grier about patient BP status, per MD patient can go to Mother baby room now.

## 2018-03-06 NOTE — ANESTHESIA POSTPROCEDURE EVALUATION
Patient: Thomas Noriega    Procedure Summary     Date Anesthesia Start Anesthesia Stop Room / Location    18 0805 1315 [facility] /  [facility]       Procedure Diagnosis Surgeon Provider     SECTION PRIMARY (N/A Abdomen) No diagnosis on file. MD Catracho Stahl MD          Anesthesia Type: epidural  Last vitals  BP   148/84 (18 1150)   Temp   98.5 °F (36.9 °C) (18 1048)   Pulse   109 (18 1150)   Resp   18 (18 1048)     SpO2    100     Post Anesthesia Care and Evaluation    Patient location during evaluation: bedside  Patient participation: complete - patient participated  Level of consciousness: awake and alert  Pain score: 0  Pain management: adequate  Airway patency: patent  Anesthetic complications: No anesthetic complications    Cardiovascular status: acceptable  Respiratory status: acceptable  Hydration status: acceptable

## 2018-03-06 NOTE — LACTATION NOTE
"This note was copied from a baby's chart.     03/06/18 1700   Maternal Information   Date of Referral 03/06/18   Person Making Referral other (see comments)  (courtesy)   Maternal Reason for Referral breastfeeding currently   Maternal Infant Assessment   Size Issue, Bilateral Breasts no   Shape, Bilateral Breasts round   Density, Bilateral Breasts soft   Nipples, Bilateral everted   Nipple Conditions, Bilateral intact   Infant Assessment   Sucking Reflex present   Rooting Reflex present   Swallow Reflex present   LATCH Score   Latch 2-->grasps breast, tongue down, lips flanged, rhythmic sucking   Audible Swallowing 1-->a few with stimulation   Type Of Nipple 2-->everted (after stimulation)   Comfort (Breast/Nipple) 2-->soft/nontender   Hold (Positioning) 1-->minimal assist, teach one side: mother does other, staff holds   Score (less than 7 for 2/more consecutive times, consult Lactation Consultant) 8   Maternal Infant Feeding   Maternal Emotional State relaxed   Previous Breastfeeding History no   Infant Positioning clutch/\"football\"   Signs of Milk Transfer audible swallow   Presence of Pain no   Comfort Measures Before/During Feeding infant position adjusted;latch adjusted;maternal position adjusted;suction broken using finger   Latch Assistance yes   Current Delivery Breastfeeding History   Currently Breastfeeding yes   Feeding Infant   Feeding Readiness Cues rooting   Satiety Cues cessation of sucking   Effective Latch During Feeding yes   Audible Swallow yes   Suck/Swallow Coordination present   Skin-to-Skin Contact During Feeding yes   Equipment Type/Education   Breast Pump Type double electric, personal     "

## 2018-03-06 NOTE — ANESTHESIA PREPROCEDURE EVALUATION
Anesthesia Evaluation     Patient summary reviewed and Nursing notes reviewed                Airway   Mallampati: I  TM distance: <3 FB  Neck ROM: full  no difficulty expected  Dental - normal exam     Pulmonary - negative pulmonary ROS and normal exam   Cardiovascular - negative cardio ROS and normal exam        Neuro/Psych- negative ROS  GI/Hepatic/Renal/Endo - negative ROS     Musculoskeletal (-) negative ROS    Abdominal  - normal exam    Bowel sounds: normal.   Substance History - negative use     OB/GYN negative ob/gyn ROS         Other                        Anesthesia Plan    ASA 2 - emergent     epidural     Anesthetic plan and risks discussed with patient.  Use of blood products discussed with patient .   Plan discussed with attending.

## 2018-03-06 NOTE — NURSING NOTE
RN called Dr. Garrison to notify her about fetal monitoring strip and interventions performed to this point.  MD reviewed strip and requests SVE to perform fetal scalp stim, if baby responsive to fetal scalp stim RN is to continue increasing pitocin per protocol and update MD on any changes.

## 2018-03-06 NOTE — PLAN OF CARE
Problem: Patient Care Overview (Adult)  Goal: Plan of Care Review  Outcome: Ongoing (interventions implemented as appropriate)      Problem: Breastfeeding (Adult,NICU,[place],Obstetrics,Pediatric)  Goal: Signs and Symptoms of Listed Potential Problems Will be Absent or Manageable (Breastfeeding)  Outcome: Ongoing (interventions implemented as appropriate)

## 2018-03-06 NOTE — OP NOTE
Operative Note    Patient name: Thomas Noriega  YOB: 1990   MRN: [id number]  Admission Date: 3/5/2018  Referring Provider: Colby Grier MD    ID: 27 y.o.  at 39w5d    Preoperative Diagnosis:   Failure to progress ,nonreassuring FHR  L peritubal cyst  Postoperative Diagnosis: Same as above.    Procedure(s): primarylow transverse  delivery , excision L peritubal cyst    Surgeons: Surgeon(s) and Role:     * Colby Grier MD - Primary    Anesthesia: Epidural    Estimated Blood Loss: 800 mL mL    IV Fluids: [unfilled]    Preoperative antibiotic: Ancef (cefazolin) 2 grams    Blood products:   Blood Administration Record     None          Pathology:   Order Name Source Comment Collection Info Order Time   BLOOD GAS, ARTERIAL, CORD Umbilical Cord  Collected By: Anneliese Eason RN 3/6/2018 12:47 PM   BLOOD GAS, VENOUS, CORD Umbilical Cord   3/6/2018 12:47 PM   TISSUE PATHOLOGY EXAM Fallopian Tube, Left   Paratubal cyst removed from left side during primary  section Collected By: Anneliese Eason RN 3/6/2018  1:01 PM    Specimen source(s):  Fallopian Tube, Left paratubal cyst         Drains: Carrion catheter to gravity    Complications: None    Condition: Stable to recovery room                                          Infant:                 Gender: female  infant    Weight: 3549 g (7 lb 13.2 oz)     Apgars:    @ 1 minute /        @ 5 minutes    Cord gases: Venous:  @BABYNOHDR(BRIEFLAB, PHCVEN, BECVEN)@     Arterial:  @BABYNOHDR(BRIEFLAB, PHCART, BECART)@         Operative Summary:   After obtaining informed consent the patient was taken to the operating room where adequate anesthesia was obtained.  Carrion catheter was placed in the bladder preoperatively.  IV antibiotics were given preoperatively.       The abdomen was prepped and draped in the usual sterile fashion for  delivery.  After confirming adequate anesthesia a Pfannenstiel skin incision was made with the  scalpel and carried through to the underlying layer of fascia.  The fascia was incised in the midline and the incision extended laterally with the Tolbert scissors and with blunt dissection.       The upper aspect of the fascia was grasped with 2 Kocher clamps, elevated, and dissected off the underlying rectus muscles bluntly and with the Tolbert scissors.  The Kocher clamps were removed and applied to the inferior aspect of the fascia.  The fascia was dissected off of the rectus muscles in the same fashion.  The peritoneum was entered bluntly.  The incision was stretched and the bladder blade and Boyle retractor inserted for visualization of the uterus.      The uterus was incised with the scalpel in a low transverse fashion.  The uterine incision was entered digitally and the incision extended bluntly in a cranial-caudal fashion.  Retractors were removed and membranes were ruptured.  The infant was delivered atraumatically from cephalic presentation.  The umbilical cord was clamped and cut and the nose and mouth bulb suctioned.  The infant was handed off to waiting pediatric staff.      Cord blood gases were collected from a clamped segment of umbilical cord.  Cord blood was collected.  The placenta was removed using cord traction and uterine massage.  The uterus was exteriorized and cleared of all clots and debris.  The uterine incision was repaired with 1-0 Chromic in a running locked fashion. A single-layer technique was used.  Additional hemostatic measures required: electrocautery.    The incision was inspected and excellent hemostasis was noted.  The tubes and ovaries were noted to be normal. The uterus was returned to the abdomen.  The gutters were cleared of all clots and debris.  Irrigation was used.  The uterine incision was again inspected and found to be hemostatic.      The peritoneum was reapproximated with 2.0 Vicryl .  The fascia was closed with 0 Vicryl  in a running fashion (two segments).  The  subcutaneous space was reapproximated using 3.0 Plain.      The skin was closed using staples.  The patient was transferred to the recovery room in stable condition.     A 1 cm peritubal cyst was removed from the end of the left fallopian tube.  The pedicle was electrocauterized and tied with a 30 plain suture.      Colby Grier MD  3/6/2018  1:12 PM

## 2018-03-07 ENCOUNTER — HOSPITAL ENCOUNTER (OUTPATIENT)
Dept: LABOR AND DELIVERY | Facility: HOSPITAL | Age: 28
Discharge: HOME | End: 2018-03-07

## 2018-03-07 LAB
ALP SERPL-CCNC: 130 U/L (ref 25–100)
ALT SERPL W P-5'-P-CCNC: 13 U/L (ref 7–40)
AST SERPL-CCNC: 22 U/L (ref 0–33)
BASOPHILS # BLD AUTO: 0.02 10*3/MM3 (ref 0–0.2)
BASOPHILS NFR BLD AUTO: 0.2 % (ref 0–1)
BILIRUB SERPL-MCNC: 0.5 MG/DL (ref 0.3–1.2)
CREAT BLD-MCNC: 0.6 MG/DL (ref 0.6–1.3)
CYTO UR: NORMAL
DEPRECATED RDW RBC AUTO: 49.5 FL (ref 37–54)
EOSINOPHIL # BLD AUTO: 0.09 10*3/MM3 (ref 0–0.3)
EOSINOPHIL NFR BLD AUTO: 0.9 % (ref 0–3)
ERYTHROCYTE [DISTWIDTH] IN BLOOD BY AUTOMATED COUNT: 15.5 % (ref 11.3–14.5)
HCT VFR BLD AUTO: 29.7 % (ref 34.5–44)
HGB BLD-MCNC: 10 G/DL (ref 11.5–15.5)
IMM GRANULOCYTES # BLD: 0.03 10*3/MM3 (ref 0–0.03)
IMM GRANULOCYTES NFR BLD: 0.3 % (ref 0–0.6)
LAB AP CASE REPORT: NORMAL
LAB AP CLINICAL INFORMATION: NORMAL
LDH SERPL-CCNC: 236 U/L (ref 120–246)
LYMPHOCYTES # BLD AUTO: 2.47 10*3/MM3 (ref 0.6–4.8)
LYMPHOCYTES NFR BLD AUTO: 25.4 % (ref 24–44)
Lab: NORMAL
MCH RBC QN AUTO: 29.7 PG (ref 27–31)
MCHC RBC AUTO-ENTMCNC: 33.7 G/DL (ref 32–36)
MCV RBC AUTO: 88.1 FL (ref 80–99)
MONOCYTES # BLD AUTO: 0.55 10*3/MM3 (ref 0–1)
MONOCYTES NFR BLD AUTO: 5.7 % (ref 0–12)
NEUTROPHILS # BLD AUTO: 6.57 10*3/MM3 (ref 1.5–8.3)
NEUTROPHILS NFR BLD AUTO: 67.5 % (ref 41–71)
PATH REPORT.FINAL DX SPEC: NORMAL
PATH REPORT.GROSS SPEC: NORMAL
PLATELET # BLD AUTO: 168 10*3/MM3 (ref 150–450)
PMV BLD AUTO: 10.4 FL (ref 6–12)
RBC # BLD AUTO: 3.37 10*6/MM3 (ref 3.89–5.14)
URATE SERPL-MCNC: 5.5 MG/DL (ref 3.1–7.8)
WBC NRBC COR # BLD: 9.73 10*3/MM3 (ref 3.5–10.8)

## 2018-03-07 PROCEDURE — 82565 ASSAY OF CREATININE: CPT | Performed by: OBSTETRICS & GYNECOLOGY

## 2018-03-07 PROCEDURE — 84075 ASSAY ALKALINE PHOSPHATASE: CPT | Performed by: OBSTETRICS & GYNECOLOGY

## 2018-03-07 PROCEDURE — 82247 BILIRUBIN TOTAL: CPT | Performed by: OBSTETRICS & GYNECOLOGY

## 2018-03-07 PROCEDURE — 84550 ASSAY OF BLOOD/URIC ACID: CPT | Performed by: OBSTETRICS & GYNECOLOGY

## 2018-03-07 PROCEDURE — 83615 LACTATE (LD) (LDH) ENZYME: CPT | Performed by: OBSTETRICS & GYNECOLOGY

## 2018-03-07 PROCEDURE — 84450 TRANSFERASE (AST) (SGOT): CPT | Performed by: OBSTETRICS & GYNECOLOGY

## 2018-03-07 PROCEDURE — 85025 COMPLETE CBC W/AUTO DIFF WBC: CPT | Performed by: OBSTETRICS & GYNECOLOGY

## 2018-03-07 PROCEDURE — 84460 ALANINE AMINO (ALT) (SGPT): CPT | Performed by: OBSTETRICS & GYNECOLOGY

## 2018-03-07 RX ORDER — AMMONIA INHALANTS 0.04 G/.3ML
INHALANT RESPIRATORY (INHALATION)
Status: DISCONTINUED
Start: 2018-03-07 | End: 2018-03-07 | Stop reason: WASHOUT

## 2018-03-07 RX ORDER — NIFEDIPINE 30 MG/1
30 TABLET, EXTENDED RELEASE ORAL
Status: DISCONTINUED | OUTPATIENT
Start: 2018-03-07 | End: 2018-03-09 | Stop reason: HOSPADM

## 2018-03-07 RX ADMIN — OXYCODONE HYDROCHLORIDE AND ACETAMINOPHEN 1 TABLET: 7.5; 325 TABLET ORAL at 05:18

## 2018-03-07 RX ADMIN — IBUPROFEN 600 MG: 600 TABLET, FILM COATED ORAL at 18:19

## 2018-03-07 RX ADMIN — OXYCODONE HYDROCHLORIDE AND ACETAMINOPHEN 1 TABLET: 7.5; 325 TABLET ORAL at 14:14

## 2018-03-07 RX ADMIN — DOCUSATE SODIUM 100 MG: 100 CAPSULE, LIQUID FILLED ORAL at 09:39

## 2018-03-07 RX ADMIN — DOCUSATE SODIUM 100 MG: 100 CAPSULE, LIQUID FILLED ORAL at 18:19

## 2018-03-07 RX ADMIN — OXYCODONE HYDROCHLORIDE AND ACETAMINOPHEN 1 TABLET: 7.5; 325 TABLET ORAL at 22:22

## 2018-03-07 RX ADMIN — OXYCODONE HYDROCHLORIDE AND ACETAMINOPHEN 1 TABLET: 7.5; 325 TABLET ORAL at 18:19

## 2018-03-07 RX ADMIN — NIFEDIPINE 30 MG: 30 TABLET, FILM COATED, EXTENDED RELEASE ORAL at 09:39

## 2018-03-07 RX ADMIN — OXYCODONE HYDROCHLORIDE AND ACETAMINOPHEN 1 TABLET: 7.5; 325 TABLET ORAL at 09:39

## 2018-03-07 RX ADMIN — IBUPROFEN 600 MG: 600 TABLET, FILM COATED ORAL at 09:39

## 2018-03-07 NOTE — PROGRESS NOTES
3/7/2018    Name:Thomas Noriega    MR#:[id number]     PROGRESS NOTE:  Post-Op 1 S/P    HD:1    Subjective   27 y.o. yo Female  s/p CS at 39w5d doing well. Pain well controlled. Tolerating regular diet and not having flatus. Lochia normal. Denies HA, vision changes.     Patient Active Problem List   Diagnosis   • Breech presentation   • Labor without complication        Objective    Vitals  Temp:  Temp:  [97.9 °F (36.6 °C)-99 °F (37.2 °C)] 98.3 °F (36.8 °C)  Temp src: Oral  BP:  BP: (124-176)/(71-96) 131/83  Pulse:  Heart Rate:  [] 76  RR:   Resp:  [16-18] 16    General Awake, alert, no distress  Abdomen Soft, non-distended, fundus firm, below umbilicus, appropriately tender  Incision  drsg Intact, no erythema or exudate  Extremities Calves NT bilaterally     I/O last 3 completed shifts:  In: 1000 [I.V.:1000]  Out: 4325 [Urine:3525; Blood:800]    LABS:   Lab Results   Component Value Date    WBC 9.73 2018    HGB 10.0 (L) 2018    HCT 29.7 (L) 2018    MCV 88.1 2018     2018       Infant: female , doing well.      Assessment   1.  POD 1   2. GHTN- on procardia XL 30 mg- BP well controlled, PEP normal, asymptomatic.    Plan: Doing well.  Routine postoperative care. Advance      Active Problems:   None      Patricia Thomas, APRN  3/7/2018 9:12 AM

## 2018-03-07 NOTE — ANESTHESIA POSTPROCEDURE EVALUATION
Patient: Thomas Noriega    Procedure Summary     Date Anesthesia Start Anesthesia Stop Room / Location    18 0805 1315 [facility] /  [facility]       Procedure Diagnosis Surgeon Provider     SECTION PRIMARY (N/A Abdomen) No diagnosis on file. MD Catracho Stahl MD          Anesthesia Type: epidural  Last vitals  BP   129/75 (18 1130)   Temp   98 °F (36.7 °C) (18 1130)   Pulse   84 (18 1130)   Resp   16 (18 1130)     SpO2   92 % (18 1603)     Post Anesthesia Care and Evaluation    Patient location during evaluation: bedside  Patient participation: complete - patient participated  Level of consciousness: awake and alert  Pain management: adequate  Airway patency: patent  Anesthetic complications: No anesthetic complications    Cardiovascular status: acceptable  Respiratory status: acceptable  Hydration status: acceptable  Post Neuraxial Block status: Motor and sensory function returned to baseline and No signs or symptoms of PDPH

## 2018-03-08 RX ADMIN — OXYCODONE HYDROCHLORIDE AND ACETAMINOPHEN 1 TABLET: 7.5; 325 TABLET ORAL at 15:58

## 2018-03-08 RX ADMIN — OXYCODONE HYDROCHLORIDE AND ACETAMINOPHEN 1 TABLET: 7.5; 325 TABLET ORAL at 11:30

## 2018-03-08 RX ADMIN — OXYCODONE HYDROCHLORIDE AND ACETAMINOPHEN 1 TABLET: 7.5; 325 TABLET ORAL at 02:43

## 2018-03-08 RX ADMIN — IBUPROFEN 600 MG: 600 TABLET, FILM COATED ORAL at 11:30

## 2018-03-08 RX ADMIN — IBUPROFEN 600 MG: 600 TABLET, FILM COATED ORAL at 02:43

## 2018-03-08 RX ADMIN — OXYCODONE HYDROCHLORIDE AND ACETAMINOPHEN 1 TABLET: 7.5; 325 TABLET ORAL at 20:20

## 2018-03-08 RX ADMIN — DOCUSATE SODIUM 100 MG: 100 CAPSULE, LIQUID FILLED ORAL at 09:20

## 2018-03-08 RX ADMIN — IBUPROFEN 600 MG: 600 TABLET, FILM COATED ORAL at 18:10

## 2018-03-08 RX ADMIN — OXYCODONE HYDROCHLORIDE AND ACETAMINOPHEN 1 TABLET: 7.5; 325 TABLET ORAL at 06:34

## 2018-03-08 RX ADMIN — NIFEDIPINE 30 MG: 30 TABLET, FILM COATED, EXTENDED RELEASE ORAL at 09:20

## 2018-03-08 NOTE — PLAN OF CARE
Problem: Breastfeeding (Adult,NICU,[place],Obstetrics,Pediatric)  Goal: Signs and Symptoms of Listed Potential Problems Will be Absent or Manageable (Breastfeeding)  Outcome: Ongoing (interventions implemented as appropriate)

## 2018-03-08 NOTE — LACTATION NOTE
This note was copied from a baby's chart.  Mom reports baby is latching and nursing well.  Cluster fed last night.  Nipples tender but intact.

## 2018-03-08 NOTE — PROGRESS NOTES
3/8/2018    Name:Thomas Noriega    MR#:[id number]     PROGRESS NOTE:  Post-Op 2 S/P    HD:2    Subjective   27 y.o. yo Female  s/p CS at 39w5d doing well. Pain well controlled. Tolerating regular diet and having flatus. Lochia normal.     Patient Active Problem List   Diagnosis   • Breech presentation   • Labor without complication        Objective    Vitals  Temp:  Temp:  [97.8 °F (36.6 °C)-98.2 °F (36.8 °C)] 97.8 °F (36.6 °C)  Temp src: Oral  BP:  BP: (120-138)/(64-89) 135/89  Pulse:  Heart Rate:  [76-85] 77  RR:   Resp:  [16-20] 20    General Awake, alert, no distress  Abdomen Soft, non-distended, fundus firm, below umbilicus, appropriately tender  Incision  Staples Intact, no erythema or exudate  Extremities Calves NT bilaterally     I/O last 3 completed shifts:  In: -   Out: 3300 [Urine:3300]    LABS:   Lab Results   Component Value Date    WBC 9.73 2018    HGB 10.0 (L) 2018    HCT 29.7 (L) 2018    MCV 88.1 2018     2018       Infant: female , doing well.       Assessment   1.  POD 2    Plan: Doing well.  Routine postoperative care      Active Problems:   None      Patricia Thomas, SHAUN  3/8/2018 9:00 AM

## 2018-03-09 VITALS
HEART RATE: 75 BPM | HEIGHT: 62 IN | TEMPERATURE: 97.7 F | OXYGEN SATURATION: 92 % | DIASTOLIC BLOOD PRESSURE: 89 MMHG | RESPIRATION RATE: 16 BRPM | SYSTOLIC BLOOD PRESSURE: 134 MMHG

## 2018-03-09 RX ORDER — IBUPROFEN 600 MG/1
600 TABLET ORAL EVERY 6 HOURS PRN
Qty: 30 TABLET | Refills: 0 | Status: SHIPPED | OUTPATIENT
Start: 2018-03-09 | End: 2020-10-27

## 2018-03-09 RX ORDER — OXYCODONE HYDROCHLORIDE AND ACETAMINOPHEN 5; 325 MG/1; MG/1
1-2 TABLET ORAL EVERY 4 HOURS PRN
Qty: 20 TABLET | Refills: 0 | Status: SHIPPED | OUTPATIENT
Start: 2018-03-09 | End: 2018-03-12

## 2018-03-09 RX ORDER — NIFEDIPINE 30 MG/1
30 TABLET, FILM COATED, EXTENDED RELEASE ORAL
Qty: 30 TABLET | Refills: 0 | Status: SHIPPED | OUTPATIENT
Start: 2018-03-10 | End: 2020-10-27

## 2018-03-09 RX ADMIN — IBUPROFEN 600 MG: 600 TABLET, FILM COATED ORAL at 08:39

## 2018-03-09 RX ADMIN — NIFEDIPINE 30 MG: 30 TABLET, FILM COATED, EXTENDED RELEASE ORAL at 08:39

## 2018-03-09 RX ADMIN — OXYCODONE HYDROCHLORIDE AND ACETAMINOPHEN 1 TABLET: 7.5; 325 TABLET ORAL at 01:47

## 2018-03-09 RX ADMIN — OXYCODONE HYDROCHLORIDE AND ACETAMINOPHEN 1 TABLET: 7.5; 325 TABLET ORAL at 11:41

## 2018-03-09 RX ADMIN — OXYCODONE HYDROCHLORIDE AND ACETAMINOPHEN 1 TABLET: 7.5; 325 TABLET ORAL at 05:41

## 2018-03-09 RX ADMIN — IBUPROFEN 600 MG: 600 TABLET, FILM COATED ORAL at 15:25

## 2018-03-09 RX ADMIN — DOCUSATE SODIUM 100 MG: 100 CAPSULE, LIQUID FILLED ORAL at 08:39

## 2018-03-09 RX ADMIN — IBUPROFEN 600 MG: 600 TABLET, FILM COATED ORAL at 01:47

## 2018-03-09 NOTE — DISCHARGE SUMMARY
Discharge Summary    Date of Admission: 3/5/2018  Date of Discharge:  3/9/2018      Patient: Thomas Noriega      MR#:[id number]    Primary Surgeon/OB: Colby Grier MD        Presenting Problem/History of Present Illness  Labor without complication [O80]     Patient Active Problem List   Diagnosis   (none) - all problems resolved or deleted         Discharge Diagnosis:  section at 39w5d    Procedures:  , Low Transverse     3/6/2018    12:42 PM          Discharge Date: 3/9/2018; Discharge Time: 8:51 AM        Hospital Course  Patient is a 27 y.o. female  at 39w5d status post  section with uneventful postoperative recovery.  Patient was advanced to regular diet on postoperative day#1.  On discharge, ambulating, tolerating a regular diet without any difficulties and her incision is dry, clean and intact.     Infant:   female  fetus 3549 g (7 lb 13.2 oz)  with Apgar scores of 7  , 9   at five minutes.    Condition on Discharge:  Stable    Vital Signs  Temp:  [97.7 °F (36.5 °C)-98.4 °F (36.9 °C)] 97.7 °F (36.5 °C)  Heart Rate:  [75-87] 75  Resp:  [16-20] 16  BP: (130-153)/(76-89) 134/89    Lab Results   Component Value Date    WBC 9.73 2018    HGB 10.0 (L) 2018    HCT 29.7 (L) 2018    MCV 88.1 2018     2018       Discharge Disposition  Home or Self Care    Discharge Medications   Thomas Noriega   Home Medication Instructions GLORIA:[id number]    Printed on:18 0851   Medication Information                      ibuprofen (ADVIL,MOTRIN) 600 MG tablet  Take 1 tablet by mouth 1 (One) Time As Needed for Mild Pain  for up to 1 dose.             NIFEdipine XL (ADALAT CC) 30 MG 24 hr tablet  Take 1 tablet by mouth Daily.             Prenatal Vit-Fe Fumarate-FA (PRENATAL 27-) 27-1 MG tablet tablet  Take 1 tablet by mouth Daily.                 Discharge Diet:     Activity at Discharge:   Activity Instructions     Discharge Activity Restrictions        1) No driving for 2 weeks from delivery and no longer taking narcotics.   2) Do not lift / push / pull more then 10 lbs.       Pelvic Rest       For 6 weeks                  Follow-up Appointments  No future appointments.  Additional Instructions for the Follow-ups that You Need to Schedule     Call MD for problems / concerns.    As directed    Please call with concerns of depression.   Order Comments:  Please call with concerns of depression.            Discharge Follow-up with Specialty: dennis; 2 Weeks    As directed    Specialty:  dennis    Follow Up:  2 Weeks           Discharge Follow-up with Specified Provider: dr collins; 2 Weeks    As directed    To:  dr collins    Follow Up:  2 Weeks                     Carisa Mcelroy, APRN  03/09/18  8:51 AM  [facility]

## 2018-04-16 ENCOUNTER — LAB REQUISITION (OUTPATIENT)
Dept: LAB | Facility: HOSPITAL | Age: 28
End: 2018-04-16

## 2018-04-16 DIAGNOSIS — Z00.00 ROUTINE GENERAL MEDICAL EXAMINATION AT A HEALTH CARE FACILITY: ICD-10-CM

## 2018-04-16 PROCEDURE — 36415 COLL VENOUS BLD VENIPUNCTURE: CPT

## 2020-10-27 ENCOUNTER — INITIAL PRENATAL (OUTPATIENT)
Dept: OBSTETRICS AND GYNECOLOGY | Facility: CLINIC | Age: 30
End: 2020-10-27

## 2020-10-27 VITALS
DIASTOLIC BLOOD PRESSURE: 66 MMHG | BODY MASS INDEX: 27.25 KG/M2 | WEIGHT: 149 LBS | TEMPERATURE: 97.3 F | SYSTOLIC BLOOD PRESSURE: 102 MMHG

## 2020-10-27 DIAGNOSIS — Z3A.01 LESS THAN 8 WEEKS GESTATION OF PREGNANCY: Primary | ICD-10-CM

## 2020-10-27 PROCEDURE — 0501F PRENATAL FLOW SHEET: CPT | Performed by: OBSTETRICS & GYNECOLOGY

## 2020-10-27 NOTE — PROGRESS NOTES
Initial ob visit     CC- Here for care of pregnancy        Thomas Noriega is a 30 y.o. female, , who presents for her first obstetrical visit.  Her last LMP was Patient's last menstrual period was 2020..    OB History    Para Term  AB Living   2 1 1     1   SAB TAB Ectopic Molar Multiple Live Births           0 1      # Outcome Date GA Lbr Al/2nd Weight Sex Delivery Anes PTL Lv   2 Current            1 Term 18 39w5d  3549 g (7 lb 13.2 oz) F CS-LTranv EPI N RON      Complications: Fetal Intolerance       Initial positive test date : 10/6/2020 , UPT          Prior obstetric issues, potential pregnancy concerns: hypertension following previous delivery  Family history of genetic issues (includes FOB): negative  Prior infections concerning in pregnancy (Rash, fever in last 2 weeks): negative  Varicella Hx - vaccine as a child  Prior testing for Cystic Fibrosis Carrier or Sickle Cell Trait- negative  Prepregnancy BMI - Body mass index is 27.25 kg/m².  History of STD: no    Additional Pertinent History   Last Pap :   Last Completed Pap Smear       Status Date      PAP SMEAR Done 2019 Negative        History of abnormal Pap smear: no  Family history of uterine, colon, breast, or ovarian cancer: no  Performs monthly Self-Breast Exam: no  Feelings of Anxiety or Depression: no  Tobacco Usage?: No   Alcohol/Drug Use?: NO  Over the age of 35 at delivery: no  Desires Genetic Screening: undecided  Flu Status: Already given in current flu season    PMH  Past Medical History:   Diagnosis Date   • First pregnancy    • Papanicolaou smear 2018   • Pituitary microadenoma (CMS/HCC) 2017   • Pituitary microadenoma (CMS/HCC)        Current Outpatient Medications:   •  Prenatal Vit-Fe Fumarate-FA (PRENATAL 27-1) 27-1 MG tablet tablet, Take 1 tablet by mouth Daily., Disp: , Rfl:     The additional following portions of the patient's history were reviewed and updated as appropriate: allergies,  current medications, past family history, past medical history, past social history, past surgical history and problem list.    Review of Systems   Review of Systems  Current obstetric complaints : Nausea   All systems reviewed and otherwise normal.    I have reviewed and agree with the HPI, ROS, and historical information as entered above. Colby Grier MD    /66   Temp 97.3 °F (36.3 °C)   Wt 67.6 kg (149 lb)   LMP 2020   BMI 27.25 kg/m²     Physical Exam  General:  well developed; well nourished  no acute distress   Thyroid: normal to inspection and palpation   Breasts:  Examined in supine position  Symmetric without masses or skin dimpling  Nipples normal without inversion, lesions or discharge  There are no palpable axillary nodes   Abdomen: soft, non-tender; no masses  no umbilical or inguinal hernias are present  no hepato-splenomegaly   Pelvis: Not performed.  Clinical staff was present for exam        Assessment and Plan    Problem List Items Addressed This Visit     None      Visit Diagnoses     Less than 8 weeks gestation of pregnancy    -  Primary    Relevant Orders    Obstetric Panel    HIV-1 / O / 2 Ag / Antibody 4th Generation    Urinalysis With Microscopic - Urine, Clean Catch    Urine Culture - Urine, Urine, Clean Catch    Urine Drug Screen - Urine, Clean Catch    Chlamydia trachomatis, Neisseria gonorrhoeae, PCR w/ confirmation - Urine, Urine, Clean Catch          1. Pregnancy at 7w4d  2. Reviewed routine prenatal care with the office and educational materials given  No follow-ups on file.  4 new OB, previous  section, she is good to come back for lab work tomorrow    Colby Grier MD  10/27/2020

## 2020-10-28 ENCOUNTER — LAB (OUTPATIENT)
Dept: LAB | Facility: HOSPITAL | Age: 30
End: 2020-10-28

## 2020-10-28 LAB
ABO GROUP BLD: NORMAL
APPEARANCE UR: CLEAR
BACTERIA #/AREA URNS HPF: NORMAL /[HPF]
BILIRUB UR QL STRIP: NEGATIVE
BLD GP AB SCN SERPL QL: NEGATIVE
COLOR UR: YELLOW
EPI CELLS #/AREA URNS HPF: NORMAL /HPF (ref 0–10)
GLUCOSE UR QL: NEGATIVE
HGB UR QL STRIP: NEGATIVE
KETONES UR QL STRIP: NEGATIVE
LEUKOCYTE ESTERASE UR QL STRIP: NEGATIVE
Lab: NORMAL
MICRO URNS: NORMAL
MICRO URNS: NORMAL
MUCOUS THREADS URNS QL MICRO: PRESENT
NITRITE UR QL STRIP: NEGATIVE
PH UR STRIP: 6.5 [PH] (ref 5–7.5)
PROT UR QL STRIP: NEGATIVE
RBC #/AREA URNS HPF: NORMAL /HPF (ref 0–2)
RH BLD: POSITIVE
SP GR UR: 1.01 (ref 1–1.03)
UROBILINOGEN UR STRIP-MCNC: 0.2 MG/DL (ref 0.2–1)
WBC #/AREA URNS HPF: NORMAL /HPF (ref 0–5)

## 2020-10-28 PROCEDURE — 86901 BLOOD TYPING SEROLOGIC RH(D): CPT | Performed by: OBSTETRICS & GYNECOLOGY

## 2020-10-28 PROCEDURE — 86900 BLOOD TYPING SEROLOGIC ABO: CPT | Performed by: OBSTETRICS & GYNECOLOGY

## 2020-10-28 PROCEDURE — 80081 OBSTETRIC PANEL INC HIV TSTG: CPT | Performed by: OBSTETRICS & GYNECOLOGY

## 2020-10-28 PROCEDURE — 84146 ASSAY OF PROLACTIN: CPT | Performed by: OBSTETRICS & GYNECOLOGY

## 2020-10-28 PROCEDURE — 36415 COLL VENOUS BLD VENIPUNCTURE: CPT | Performed by: OBSTETRICS & GYNECOLOGY

## 2020-10-28 PROCEDURE — 86850 RBC ANTIBODY SCREEN: CPT | Performed by: OBSTETRICS & GYNECOLOGY

## 2020-10-28 PROCEDURE — 86803 HEPATITIS C AB TEST: CPT | Performed by: OBSTETRICS & GYNECOLOGY

## 2020-10-29 LAB
BASOPHILS # BLD AUTO: 0.05 10*3/MM3 (ref 0–0.2)
BASOPHILS NFR BLD AUTO: 0.5 % (ref 0–1.5)
DEPRECATED RDW RBC AUTO: 42.5 FL (ref 37–54)
EOSINOPHIL # BLD AUTO: 0.24 10*3/MM3 (ref 0–0.4)
EOSINOPHIL NFR BLD AUTO: 2.6 % (ref 0.3–6.2)
ERYTHROCYTE [DISTWIDTH] IN BLOOD BY AUTOMATED COUNT: 12.8 % (ref 12.3–15.4)
HBV SURFACE AG SERPL QL IA: NORMAL
HCT VFR BLD AUTO: 38.3 % (ref 34–46.6)
HCV AB SER DONR QL: NORMAL
HGB BLD-MCNC: 12.9 G/DL (ref 12–15.9)
HIV1+2 AB SER QL: NORMAL
HOLD SPECIMEN: NORMAL
IMM GRANULOCYTES # BLD AUTO: 0.03 10*3/MM3 (ref 0–0.05)
IMM GRANULOCYTES NFR BLD AUTO: 0.3 % (ref 0–0.5)
LYMPHOCYTES # BLD AUTO: 2.4 10*3/MM3 (ref 0.7–3.1)
LYMPHOCYTES NFR BLD AUTO: 26.4 % (ref 19.6–45.3)
MCH RBC QN AUTO: 29.9 PG (ref 26.6–33)
MCHC RBC AUTO-ENTMCNC: 33.7 G/DL (ref 31.5–35.7)
MCV RBC AUTO: 88.9 FL (ref 79–97)
MONOCYTES # BLD AUTO: 0.55 10*3/MM3 (ref 0.1–0.9)
MONOCYTES NFR BLD AUTO: 6 % (ref 5–12)
NEUTROPHILS NFR BLD AUTO: 5.83 10*3/MM3 (ref 1.7–7)
NEUTROPHILS NFR BLD AUTO: 64.2 % (ref 42.7–76)
NRBC BLD AUTO-RTO: 0 /100 WBC (ref 0–0.2)
PLATELET # BLD AUTO: 266 10*3/MM3 (ref 140–450)
PMV BLD AUTO: 11.5 FL (ref 6–12)
PROLACTIN SERPL-MCNC: 20.9 NG/ML (ref 4.79–23.3)
RBC # BLD AUTO: 4.31 10*6/MM3 (ref 3.77–5.28)
RPR SER QL: NORMAL
RUBV IGG SERPL IA-ACNC: POSITIVE
WBC # BLD AUTO: 9.1 10*3/MM3 (ref 3.4–10.8)

## 2020-10-30 LAB
AMPHETAMINES UR QL SCN: NEGATIVE NG/ML
BACTERIA UR CULT: NORMAL
BACTERIA UR CULT: NORMAL
BARBITURATES UR QL SCN: NEGATIVE NG/ML
BENZODIAZ UR QL SCN: NEGATIVE NG/ML
BZE UR QL SCN: NEGATIVE NG/ML
C TRACH RRNA SPEC QL NAA+PROBE: NEGATIVE
CANNABINOIDS UR QL SCN: NEGATIVE NG/ML
CREAT UR-MCNC: 61.8 MG/DL (ref 20–300)
LABORATORY COMMENT REPORT: NORMAL
METHADONE UR QL SCN: NEGATIVE NG/ML
N GONORRHOEA RRNA SPEC QL NAA+PROBE: NEGATIVE
OPIATES UR QL SCN: NEGATIVE NG/ML
OXYCODONE+OXYMORPHONE UR QL SCN: NEGATIVE NG/ML
PCP UR QL: NEGATIVE NG/ML
PH UR: 6.4 [PH] (ref 4.5–8.9)
PROPOXYPH UR QL SCN: NEGATIVE NG/ML
WRITTEN AUTHORIZATION: NORMAL

## 2020-11-20 ENCOUNTER — ROUTINE PRENATAL (OUTPATIENT)
Dept: OBSTETRICS AND GYNECOLOGY | Facility: CLINIC | Age: 30
End: 2020-11-20

## 2020-11-20 VITALS — WEIGHT: 152 LBS | SYSTOLIC BLOOD PRESSURE: 118 MMHG | BODY MASS INDEX: 27.8 KG/M2 | DIASTOLIC BLOOD PRESSURE: 72 MMHG

## 2020-11-20 DIAGNOSIS — Z3A.11 11 WEEKS GESTATION OF PREGNANCY: Primary | ICD-10-CM

## 2020-11-20 LAB
GLUCOSE UR STRIP-MCNC: NEGATIVE MG/DL
PROT UR STRIP-MCNC: NEGATIVE MG/DL

## 2020-11-20 PROCEDURE — 0502F SUBSEQUENT PRENATAL CARE: CPT | Performed by: NURSE PRACTITIONER

## 2020-11-20 NOTE — PROGRESS NOTES
OB FOLLOW UP  CC- Here for care of pregnancy        Thomas Noriega is a 30 y.o.  11w0d patient being seen today for her obstetrical follow up visit. Patient reports no complaints. Pt feels well, no c/o.    Her prenatal care is complicated by (and status) :    Patient Active Problem List   Diagnosis   (none) - all problems resolved or deleted       Flu Status: Already given in current flu season  Ultrasound Today: Yes.  Findings showed .     ROS -   Patient Reports : No Problems  Patient Denies: Loss of Fluid, Vaginal Spotting, Vision Changes, Headaches and Nausea   Fetal Movement : not at this gestation  All other systems reviewed and are negative.       The additional following portions of the patient's history were reviewed and updated as appropriate: allergies, current medications, past family history, past medical history, past social history, past surgical history and problem list.    I have reviewed and agree with the HPI, ROS, and historical information as entered above. Caroline Gregory, APRN    /72   Wt 68.9 kg (152 lb)   LMP 2020   BMI 27.80 kg/m²       EXAM:     FHT: 176 BPM   Pelvic Exam: No    Urine glucose/protein: See prenatal flowsheet       Assessment and Plan    Problem List Items Addressed This Visit     None      Visit Diagnoses     11 weeks gestation of pregnancy    -  Primary    Relevant Orders    POC Urinalysis Dipstick, Automated (Completed)    US Ob Limited 1 + Fetuses          1. Pregnancy at 11w0d  2. Fetal status reassuring. Declines genetic testing.   3. Activity and Exercise discussed.  Return in about 4 weeks (around 2020).    Caroline Gregory, SHAUN  2020

## 2020-12-15 ENCOUNTER — ROUTINE PRENATAL (OUTPATIENT)
Dept: OBSTETRICS AND GYNECOLOGY | Facility: CLINIC | Age: 30
End: 2020-12-15

## 2020-12-15 VITALS — BODY MASS INDEX: 27.98 KG/M2 | WEIGHT: 153 LBS | SYSTOLIC BLOOD PRESSURE: 128 MMHG | DIASTOLIC BLOOD PRESSURE: 80 MMHG

## 2020-12-15 DIAGNOSIS — Z3A.14 14 WEEKS GESTATION OF PREGNANCY: Primary | ICD-10-CM

## 2020-12-15 LAB
EXPIRATION DATE: NORMAL
GLUCOSE UR STRIP-MCNC: NEGATIVE MG/DL
Lab: NORMAL
PROT UR STRIP-MCNC: NEGATIVE MG/DL

## 2020-12-15 PROCEDURE — 0502F SUBSEQUENT PRENATAL CARE: CPT | Performed by: OBSTETRICS & GYNECOLOGY

## 2020-12-15 NOTE — PROGRESS NOTES
OB FOLLOW UP    Thomas Noriega is a 30 y.o.  14w4d patient being seen today for her obstetrical follow up visit. Patient reports no complaints. Requesting cfDNA testing    Her prenatal care is complicated by (and status) : none    ROS -   Contractions: Negative   problems: Negative  GI problems: Negative  Bleeding or Leaking: Negative  Fetal Movement : Has not started feeling movements      Current Outpatient Medications:   •  Prenatal Vit-Fe Fumarate-FA (PRENATAL 27-) 27-1 MG tablet tablet, Take 1 tablet by mouth Daily., Disp: , Rfl:     LMP 2020     FHT:  150 BPM    Uterine Size: size equals dates   Presentations: unsure        Uterus-nontender   Assessment    1. Pregnancy at 14w4d  2. Fetal status reassuring     Problem List Items Addressed This Visit     None      Breast exam normal right axilla, possible breast tissue or small amount of tissue     Plan    1. P/patient return in 4 weeks  2. Prenatal teaching done and patient questions were answered  3. Cell free DNA today, small area in the right axilla probably benign    Candi Hassan RN  12/15/2020

## 2020-12-28 ENCOUNTER — TELEPHONE (OUTPATIENT)
Dept: OBSTETRICS AND GYNECOLOGY | Facility: CLINIC | Age: 30
End: 2020-12-28

## 2020-12-30 LAB
CFDNA.FET/CFDNA.TOTAL SFR FETUS: NORMAL %
CITATION REF LAB TEST: NORMAL
FET 13+18+21+X+Y ANEUP PLAS.CFDNA: NEGATIVE
FET CHR 21 TS PLAS.CFDNA QL: NEGATIVE
FET SEX PLAS.CFDNA DOSAGE CFDNA: NORMAL
FET TS 13 RISK PLAS.CFDNA QL: NEGATIVE
FET TS 18 RISK WBC.DNA+CFDNA QL: NEGATIVE
GA EST FROM CONCEPTION DATE: NORMAL D
GESTATIONAL AGE > 9:: YES
LAB DIRECTOR NAME PROVIDER: NORMAL
LAB DIRECTOR NAME PROVIDER: NORMAL
LABORATORY COMMENT REPORT: NORMAL
LIMITATIONS OF THE TEST: NORMAL
NEGATIVE PREDICTIVE VALUE: NORMAL
NOTE: NORMAL
PERFORMANCE CHARACTERISTICS: NORMAL
POSITIVE PREDICTIVE VALUE: NORMAL
REF LAB TEST METHOD: NORMAL
TEST PERFORMANCE INFO SPEC: NORMAL

## 2021-01-12 ENCOUNTER — ROUTINE PRENATAL (OUTPATIENT)
Dept: OBSTETRICS AND GYNECOLOGY | Facility: CLINIC | Age: 31
End: 2021-01-12

## 2021-01-12 VITALS — BODY MASS INDEX: 28.17 KG/M2 | DIASTOLIC BLOOD PRESSURE: 68 MMHG | SYSTOLIC BLOOD PRESSURE: 118 MMHG | WEIGHT: 154 LBS

## 2021-01-12 DIAGNOSIS — Z3A.18 18 WEEKS GESTATION OF PREGNANCY: Primary | ICD-10-CM

## 2021-01-12 LAB
GLUCOSE UR STRIP-MCNC: NEGATIVE MG/DL
PROT UR STRIP-MCNC: NEGATIVE MG/DL

## 2021-01-12 PROCEDURE — 0502F SUBSEQUENT PRENATAL CARE: CPT | Performed by: OBSTETRICS & GYNECOLOGY

## 2021-01-12 NOTE — PROGRESS NOTES
OB FOLLOW UP    Thomas Noriega is a 30 y.o.  18w4d patient being seen today for her obstetrical follow up visit. Patient has no complaints     Her prenatal care is complicated by no complications        Current Outpatient Medications:   •  Prenatal Vit-Fe Fumarate-FA (PRENATAL ) 27-1 MG tablet tablet, Take 1 tablet by mouth Daily., Disp: , Rfl:     /68   Wt 69.9 kg (154 lb)   LMP 2020   BMI 28.17 kg/m²     FHT:  150 BPM    Uterine Size: size equals dates   Presentations: unsure        Uterus-nontender   Assessment    1. Pregnancy at 18w4d  2. Fetal status reassuring     Problem List Items Addressed This Visit        Other    18 weeks gestation of pregnancy - Primary    Relevant Orders    POC Urinalysis Dipstick (Completed)    US Ob 14 + Weeks Single or First Gestation          Plan    1. P/patient return in 2 weeks  2. Prenatal teaching done and patient questions were answered  3. Ultrasound next week    Colby Grier MD  2021

## 2021-01-20 ENCOUNTER — ROUTINE PRENATAL (OUTPATIENT)
Dept: OBSTETRICS AND GYNECOLOGY | Facility: CLINIC | Age: 31
End: 2021-01-20

## 2021-01-20 VITALS — WEIGHT: 155 LBS | SYSTOLIC BLOOD PRESSURE: 120 MMHG | BODY MASS INDEX: 28.35 KG/M2 | DIASTOLIC BLOOD PRESSURE: 70 MMHG

## 2021-01-20 DIAGNOSIS — Z3A.20 20 WEEKS GESTATION OF PREGNANCY: Primary | ICD-10-CM

## 2021-01-20 LAB
GLUCOSE UR STRIP-MCNC: NEGATIVE MG/DL
PROT UR STRIP-MCNC: NEGATIVE MG/DL

## 2021-01-20 PROCEDURE — 0502F SUBSEQUENT PRENATAL CARE: CPT | Performed by: NURSE PRACTITIONER

## 2021-01-20 NOTE — PROGRESS NOTES
OB FOLLOW UP    Thomas Noriega is a 30 y.o.  19w5d patient being seen today for her obstetrical follow up visit. Patient reports no complaints.     Her prenatal care is complicated by (and status) :  nothing      ROS -   Patient Reports : No Problems  Patient Denies: Loss of Fluid, Vaginal Spotting, Vision Changes, Headaches and Cramping/Contractions   Fetal Movement : normal      /70   Wt 70.3 kg (155 lb)   LMP 2020   BMI 28.35 kg/m²     Ultrasound Today: yes. Wnl    EXAM:  Vitals: See prenatal flowsheet   Abdomen: Soft, nontender   Urine glucose/protein: See prenatal flowsheet   Pelvic: deferred     Assessment    Problem List Items Addressed This Visit     None      Visit Diagnoses     20 weeks gestation of pregnancy    -  Primary    Relevant Orders    POC Urinalysis Dipstick, Automated (Completed)          Plan    1. Rev good po intake and exercise.  2. RTO 4 wks and prn.  3. Anatomy US wnl today.    Carisa Mcelroy, APRN  2021

## 2021-02-23 ENCOUNTER — ROUTINE PRENATAL (OUTPATIENT)
Dept: OBSTETRICS AND GYNECOLOGY | Facility: CLINIC | Age: 31
End: 2021-02-23

## 2021-02-23 VITALS — SYSTOLIC BLOOD PRESSURE: 120 MMHG | WEIGHT: 160 LBS | BODY MASS INDEX: 29.26 KG/M2 | DIASTOLIC BLOOD PRESSURE: 70 MMHG

## 2021-02-23 DIAGNOSIS — Z3A.28 28 WEEKS GESTATION OF PREGNANCY: Primary | ICD-10-CM

## 2021-02-23 LAB
GLUCOSE UR STRIP-MCNC: NEGATIVE MG/DL
PROT UR STRIP-MCNC: NEGATIVE MG/DL

## 2021-02-23 PROCEDURE — 0502F SUBSEQUENT PRENATAL CARE: CPT | Performed by: NURSE PRACTITIONER

## 2021-02-23 NOTE — PROGRESS NOTES
OB FOLLOW UP    Thomas Noriega is a 30 y.o.  24w4d patient being seen today for her obstetrical follow up visit. Patient reports no complaints.    Her prenatal care is complicated by (and status) :    Patient Active Problem List   Diagnosis   • 18 weeks gestation of pregnancy       ROS -   Patient Reports : No Problems  Patient Denies: Loss of Fluid, Vaginal Spotting, Vision Changes, Headaches and Cramping/Contractions   Fetal Movement : normal      /70   Wt 72.6 kg (160 lb)   LMP 2020   BMI 29.26 kg/m²     Ultrasound Today: no    EXAM:  Vitals: See prenatal flowsheet   Abdomen: Non tender   Urine glucose/protein: Negative/negative   Pelvic: Not performed     Assessment    1. Pregnancy at 24w4d  2. Fetal status reassuring     Problem List Items Addressed This Visit     None      Visit Diagnoses     28 weeks gestation of pregnancy    -  Primary    Relevant Orders    CBC (No Diff)    Antibody Screen    Glucose, Post 50 Gm Glucola    POC Urinalysis Dipstick, Automated (Completed)          Plan    1. Fetal movement/ Labor Precautions  2. 28 weeks labs next visit   3. Follow up: 4 week(s)      SHAUN Rosario  2021

## 2021-03-23 ENCOUNTER — ROUTINE PRENATAL (OUTPATIENT)
Dept: OBSTETRICS AND GYNECOLOGY | Facility: CLINIC | Age: 31
End: 2021-03-23

## 2021-03-23 VITALS — WEIGHT: 165 LBS | DIASTOLIC BLOOD PRESSURE: 70 MMHG | SYSTOLIC BLOOD PRESSURE: 120 MMHG | BODY MASS INDEX: 30.18 KG/M2

## 2021-03-23 DIAGNOSIS — Z3A.28 28 WEEKS GESTATION OF PREGNANCY: Primary | ICD-10-CM

## 2021-03-23 DIAGNOSIS — Z98.891 PREVIOUS CESAREAN SECTION: ICD-10-CM

## 2021-03-23 PROBLEM — Z3A.18 18 WEEKS GESTATION OF PREGNANCY: Status: RESOLVED | Noted: 2021-01-12 | Resolved: 2021-03-23

## 2021-03-23 LAB
GLUCOSE UR STRIP-MCNC: NEGATIVE MG/DL
PROT UR STRIP-MCNC: NEGATIVE MG/DL

## 2021-03-23 PROCEDURE — 0502F SUBSEQUENT PRENATAL CARE: CPT | Performed by: OBSTETRICS & GYNECOLOGY

## 2021-03-23 NOTE — PROGRESS NOTES
OB FOLLOW UP    Thomas Noriega is a 30 y.o.  28w4d patient being seen today for her obstetrical follow up visit. Patient reports no complaints. 28wk labs/packet given A+. Feels well, good fm, no c/o.    Her prenatal care is complicated by (and status) :    Patient Active Problem List   Diagnosis   • 28 weeks gestation of pregnancy   • Previous  section       ROS -   Patient Reports : No Problems  Patient Denies: Loss of Fluid, Vaginal Spotting, Vision Changes, Headaches and Cramping/Contractions   Fetal Movement : normal      /70   Wt 74.8 kg (165 lb)   LMP 2020   BMI 30.18 kg/m²     Ultrasound Today: no    EXAM:  Vitals: See prenatal flowsheet   Abdomen: See prenatal flowsheet   Urine glucose/protein: See prenatal flowsheet   Pelvic: See prenatal flowsheet     Assessment    1. Pregnancy at 28w4d  2. Fetal status reassuring     Problem List Items Addressed This Visit        Gravid and     28 weeks gestation of pregnancy - Primary    Relevant Orders    Antibody Screen    CBC (No Diff)    Gestational Screen 1 Hr ([facility])    POC Urinalysis Dipstick, Automated (Completed)    Previous  section          Plan    1. Fetal movement/ Labor Precautions  2. Reviewed routine prenatal care with the office and educational materials given  3. Follow up: 4 week(s)    Schedule repeat  section for   Colby Grier MD  2021

## 2021-03-24 ENCOUNTER — TELEPHONE (OUTPATIENT)
Dept: OBSTETRICS AND GYNECOLOGY | Facility: CLINIC | Age: 31
End: 2021-03-24

## 2021-03-24 LAB
BLD GP AB SCN SERPL QL: NEGATIVE
ERYTHROCYTE [DISTWIDTH] IN BLOOD BY AUTOMATED COUNT: 13 % (ref 11.7–15.4)
GLUCOSE 1H P 50 G GLC PO SERPL-MCNC: 82 MG/DL (ref 65–139)
HCT VFR BLD AUTO: 38.2 % (ref 34–46.6)
HGB BLD-MCNC: 12.4 G/DL (ref 11.1–15.9)
MCH RBC QN AUTO: 29.6 PG (ref 26.6–33)
MCHC RBC AUTO-ENTMCNC: 32.5 G/DL (ref 31.5–35.7)
MCV RBC AUTO: 91 FL (ref 79–97)
PLATELET # BLD AUTO: 238 X10E3/UL (ref 150–450)
RBC # BLD AUTO: 4.19 X10E6/UL (ref 3.77–5.28)
WBC # BLD AUTO: 11 X10E3/UL (ref 3.4–10.8)

## 2021-04-21 ENCOUNTER — ROUTINE PRENATAL (OUTPATIENT)
Dept: OBSTETRICS AND GYNECOLOGY | Facility: CLINIC | Age: 31
End: 2021-04-21

## 2021-04-21 VITALS — WEIGHT: 169 LBS | DIASTOLIC BLOOD PRESSURE: 76 MMHG | BODY MASS INDEX: 30.91 KG/M2 | SYSTOLIC BLOOD PRESSURE: 118 MMHG

## 2021-04-21 DIAGNOSIS — Z3A.32 32 WEEKS GESTATION OF PREGNANCY: Primary | ICD-10-CM

## 2021-04-21 PROCEDURE — 0502F SUBSEQUENT PRENATAL CARE: CPT | Performed by: NURSE PRACTITIONER

## 2021-04-21 NOTE — PROGRESS NOTES
OB FOLLOW UP  CC- Here for care of pregnancy        Thomas Noriega is a 30 y.o.  32w5d patient being seen today for her obstetrical follow up visit. Patient reports no bleeding, no contractions, no cramping and no leaking. Reviewed 28 week labs.    Her prenatal care is complicated by (and status) :    Patient Active Problem List   Diagnosis   • 28 weeks gestation of pregnancy   • Previous  section       Flu Status: Already given in current flu season  Ultrasound Today: No.    ROS -   Patient Reports : No Problems  Patient Denies: Loss of Fluid, Vaginal Spotting, Vision Changes, Headaches, Nausea , Vomiting , Contractions and Epigastric pain  Fetal Movement : normal  All other systems reviewed and are negative.       The additional following portions of the patient's history were reviewed and updated as appropriate: allergies, current medications, past family history, past medical history, past social history, past surgical history and problem list.    I have reviewed and agree with the HPI, ROS, and historical information as entered above. Carisa Mcelroy, APRN    /76   Wt 76.7 kg (169 lb)   LMP 2020   BMI 30.91 kg/m²       EXAM:     FHT: wnl BPM   Uterine Size: 32 cm  Pelvic Exam: deferred    Urine glucose/protein: See prenatal flowsheet       Assessment and Plan    Problem List Items Addressed This Visit     None      Visit Diagnoses     32 weeks gestation of pregnancy    -  Primary    Relevant Orders    POC Glucose, Urine, Qualitative, Dipstick (Completed)    POC Protein, Urine, Qualitative, Dipstick (Completed)    US Ob Follow Up Transabdominal Approach          1. Pregnancy at 32w5d  2. Fetal status reassuring.   3. Activity and Exercise discussed.  Return in about 2 weeks (around 2021).    Carisa Mcelroy, SHAUN  2021

## 2021-05-04 ENCOUNTER — ROUTINE PRENATAL (OUTPATIENT)
Dept: OBSTETRICS AND GYNECOLOGY | Facility: CLINIC | Age: 31
End: 2021-05-04

## 2021-05-04 VITALS — WEIGHT: 171 LBS | SYSTOLIC BLOOD PRESSURE: 108 MMHG | BODY MASS INDEX: 31.28 KG/M2 | DIASTOLIC BLOOD PRESSURE: 66 MMHG

## 2021-05-04 DIAGNOSIS — Z3A.34 34 WEEKS GESTATION OF PREGNANCY: Primary | ICD-10-CM

## 2021-05-04 DIAGNOSIS — Z3A.32 32 WEEKS GESTATION OF PREGNANCY: ICD-10-CM

## 2021-05-04 LAB
EXPIRATION DATE: 0
GLUCOSE UR STRIP-MCNC: NEGATIVE MG/DL
Lab: 0
PROT UR STRIP-MCNC: NEGATIVE MG/DL

## 2021-05-04 PROCEDURE — 76816 OB US FOLLOW-UP PER FETUS: CPT | Performed by: OBSTETRICS & GYNECOLOGY

## 2021-05-04 PROCEDURE — 0502F SUBSEQUENT PRENATAL CARE: CPT | Performed by: NURSE PRACTITIONER

## 2021-05-04 NOTE — PROGRESS NOTES
OB FOLLOW UP  CC- Here for care of pregnancy         Thomas Noriega is a 30 y.o.  34w4d patient being seen today for her obstetrical follow up visit. Patient reports spotty vision and leg cramps. Patient states she started noticing the spotty vision about 3 weeks ago she states it doesn't happen very often but she drinks water when it happens and it help. She also reports having leg cramps at night for the past week.     Her prenatal care is complicated by (and status) :    Patient Active Problem List   Diagnosis   • 28 weeks gestation of pregnancy   • Previous  section       Ultrasound Today: Yes.  Findings showed FHR 130bpm and movement present. Baby is in cephalic position.  I have personally evaluated the U/S and agree with the findings. SHAUN Rosario    ROS -   Patient Reports : Vision Changes and Cramping in legs  Patient Denies: Loss of Fluid, Vaginal Spotting, Headaches, Nausea , Vomiting , Contractions and Epigastric pain  Fetal Movement : normal  All other systems reviewed and are negative.       The additional following portions of the patient's history were reviewed and updated as appropriate: allergies, current medications, past family history, past medical history, past social history and past surgical history.    I have reviewed and agree with the HPI, ROS, and historical information as entered above. SHAUN Rosario    /66   LMP 2020       EXAM:     FHT: 130 BPM   Uterine Size: size equals dates  Pelvic Exam: No    Urine glucose/protein: See prenatal flowsheet       Assessment and Plan    Problem List Items Addressed This Visit     None      Visit Diagnoses     34 weeks gestation of pregnancy    -  Primary    Relevant Orders    POC Glucose, Urine, Qualitative, Dipstick (Completed)    POC Protein, Urine, Qualitative, Dipstick (Completed)    32 weeks gestation of pregnancy              1. Pregnancy at 34w4d  2. Fetal status reassuring.   3. Activity and Exercise  discussed.  4. Growth scan today, EFW= 45%, JEREMIAS= 11.7cm  Pt. Requested information on   RTC in 2 weeks    Kanwal Costa, APRN  2021

## 2021-05-18 ENCOUNTER — ROUTINE PRENATAL (OUTPATIENT)
Dept: OBSTETRICS AND GYNECOLOGY | Facility: CLINIC | Age: 31
End: 2021-05-18

## 2021-05-18 VITALS — BODY MASS INDEX: 32.19 KG/M2 | WEIGHT: 176 LBS | SYSTOLIC BLOOD PRESSURE: 112 MMHG | DIASTOLIC BLOOD PRESSURE: 68 MMHG

## 2021-05-18 DIAGNOSIS — Z30.2 REQUEST FOR STERILIZATION: ICD-10-CM

## 2021-05-18 DIAGNOSIS — Z3A.36 36 WEEKS GESTATION OF PREGNANCY: Primary | ICD-10-CM

## 2021-05-18 DIAGNOSIS — Z98.891 PREVIOUS CESAREAN SECTION: ICD-10-CM

## 2021-05-18 PROBLEM — Z3A.28 28 WEEKS GESTATION OF PREGNANCY: Status: RESOLVED | Noted: 2021-03-23 | Resolved: 2021-05-18

## 2021-05-18 PROCEDURE — 0502F SUBSEQUENT PRENATAL CARE: CPT | Performed by: OBSTETRICS & GYNECOLOGY

## 2021-05-18 NOTE — PROGRESS NOTES
OB FOLLOW UP    Thomas Noriega is a 30 y.o.  36w4d patient being seen today for her obstetrical follow up visit. Patient reports no bleeding, no contractions, no cramping and no leaking.     Her prenatal care is complicated by (and status) : No complications    ROS -   Contractions: Denies   problems: Denies  GI problems: Denies  Bleeding or Leaking: Denies   Fetal Movement : Reports good FM      Current Outpatient Medications:   •  Prenatal Vit-Fe Fumarate-FA (PRENATAL ) 27-1 MG tablet tablet, Take 1 tablet by mouth Daily., Disp: , Rfl:     /68   Wt 79.8 kg (176 lb)   LMP 2020   BMI 32.19 kg/m²     FHT:  140 BPM    Uterine Size: size equals dates   Presentations: unsure        Uterus-nontender   Assessment    1. Pregnancy at 36w4d  2. Fetal status reassuring     Problem List Items Addressed This Visit        Genitourinary and Reproductive     Request for sterilization    Overview     At time of             Gravid and     Previous  section    36 weeks gestation of pregnancy - Primary    Relevant Orders    POC Glucose, Urine, Qualitative, Dipstick (Completed)    POC Protein, Urine, Qualitative, Dipstick (Completed)          Plan    1. P/patient return in 1 weeks  2. Prenatal teaching done and patient questions were answered  3. Repeat  and tubal sterilization  at [place]    Colby Grier MD  2021

## 2021-05-25 ENCOUNTER — ROUTINE PRENATAL (OUTPATIENT)
Dept: OBSTETRICS AND GYNECOLOGY | Facility: CLINIC | Age: 31
End: 2021-05-25

## 2021-05-25 VITALS — DIASTOLIC BLOOD PRESSURE: 72 MMHG | BODY MASS INDEX: 32.37 KG/M2 | WEIGHT: 177 LBS | SYSTOLIC BLOOD PRESSURE: 118 MMHG

## 2021-05-25 DIAGNOSIS — Z3A.37 37 WEEKS GESTATION OF PREGNANCY: Primary | ICD-10-CM

## 2021-05-25 LAB
GLUCOSE UR STRIP-MCNC: NEGATIVE MG/DL
PROT UR STRIP-MCNC: NEGATIVE MG/DL

## 2021-05-25 PROCEDURE — 0502F SUBSEQUENT PRENATAL CARE: CPT | Performed by: NURSE PRACTITIONER

## 2021-05-25 NOTE — PROGRESS NOTES
OB FOLLOW UP  CC- Here for care of pregnancy        Thomas Noriega is a 30 y.o.  37w4d patient being seen today for her obstetrical follow up visit. Patient reports no complaints.     Her prenatal care is complicated by (and status) :    Patient Active Problem List   Diagnosis   • Previous  section   • 36 weeks gestation of pregnancy   • Request for sterilization       Flu Status: Desires at future appt  Ultrasound Today: No.    ROS -   Patient Reports : No Problems  Patient Denies: Loss of Fluid, Vaginal Spotting, Vision Changes and Headaches  Fetal Movement : normal  All other systems reviewed and are negative.       The additional following portions of the patient's history were reviewed and updated as appropriate: past social history, past surgical history and problem list.    I have reviewed and agree with the HPI, ROS, and historical information as entered above. SHAUN Rosario    /72   Wt 80.3 kg (177 lb)   LMP 2020   BMI 32.37 kg/m²       EXAM:     FHT: 146 BPM   Uterine Size: size equals dates  Pelvic Exam: No    Urine glucose/protein: See prenatal flowsheet       Assessment and Plan    Problem List Items Addressed This Visit     None      Visit Diagnoses     37 weeks gestation of pregnancy    -  Primary    Relevant Orders    POC Urinalysis Dipstick (Completed)          1. Pregnancy at 37w4d  2. Fetal status reassuring.   3. Activity and Exercise discussed.  Repeat C/S and BTL 21  RTC in 1 week     SHAUN Rosario  2021

## 2021-05-28 ENCOUNTER — ROUTINE PRENATAL (OUTPATIENT)
Dept: OBSTETRICS AND GYNECOLOGY | Facility: CLINIC | Age: 31
End: 2021-05-28

## 2021-05-28 VITALS — SYSTOLIC BLOOD PRESSURE: 142 MMHG | BODY MASS INDEX: 32.78 KG/M2 | WEIGHT: 179.2 LBS | DIASTOLIC BLOOD PRESSURE: 84 MMHG

## 2021-05-28 DIAGNOSIS — O36.8130 DECREASED FETAL MOVEMENTS IN THIRD TRIMESTER, SINGLE OR UNSPECIFIED FETUS: ICD-10-CM

## 2021-05-28 DIAGNOSIS — Z3A.38 38 WEEKS GESTATION OF PREGNANCY: Primary | ICD-10-CM

## 2021-05-28 PROCEDURE — 59025 FETAL NON-STRESS TEST: CPT | Performed by: NURSE PRACTITIONER

## 2021-05-28 PROCEDURE — 0502F SUBSEQUENT PRENATAL CARE: CPT | Performed by: NURSE PRACTITIONER

## 2021-06-01 ENCOUNTER — APPOINTMENT (OUTPATIENT)
Dept: PREADMISSION TESTING | Facility: HOSPITAL | Age: 31
End: 2021-06-01

## 2021-06-01 ENCOUNTER — PREP FOR SURGERY (OUTPATIENT)
Dept: OTHER | Facility: HOSPITAL | Age: 31
End: 2021-06-01

## 2021-06-01 ENCOUNTER — ROUTINE PRENATAL (OUTPATIENT)
Dept: OBSTETRICS AND GYNECOLOGY | Facility: CLINIC | Age: 31
End: 2021-06-01

## 2021-06-01 ENCOUNTER — PRE-ADMISSION TESTING (OUTPATIENT)
Dept: PREADMISSION TESTING | Facility: HOSPITAL | Age: 31
End: 2021-06-01

## 2021-06-01 VITALS — WEIGHT: 181.6 LBS | BODY MASS INDEX: 31.39 KG/M2 | DIASTOLIC BLOOD PRESSURE: 76 MMHG | SYSTOLIC BLOOD PRESSURE: 128 MMHG

## 2021-06-01 VITALS — BODY MASS INDEX: 30.8 KG/M2 | HEIGHT: 64 IN | WEIGHT: 180.4 LBS

## 2021-06-01 DIAGNOSIS — Z34.90 PREGNANCY, UNSPECIFIED GESTATIONAL AGE: Primary | ICD-10-CM

## 2021-06-01 DIAGNOSIS — Z3A.38 38 WEEKS GESTATION OF PREGNANCY: Primary | ICD-10-CM

## 2021-06-01 DIAGNOSIS — Z98.891 PREVIOUS CESAREAN SECTION: ICD-10-CM

## 2021-06-01 DIAGNOSIS — Z34.90 PREGNANCY, UNSPECIFIED GESTATIONAL AGE: ICD-10-CM

## 2021-06-01 LAB
ABO GROUP BLD: NORMAL
BASOPHILS # BLD AUTO: 0.04 10*3/MM3 (ref 0–0.2)
BASOPHILS NFR BLD AUTO: 0.4 % (ref 0–1.5)
BLD GP AB SCN SERPL QL: NEGATIVE
DEPRECATED RDW RBC AUTO: 44.7 FL (ref 37–54)
EOSINOPHIL # BLD AUTO: 0.15 10*3/MM3 (ref 0–0.4)
EOSINOPHIL NFR BLD AUTO: 1.5 % (ref 0.3–6.2)
ERYTHROCYTE [DISTWIDTH] IN BLOOD BY AUTOMATED COUNT: 14.3 % (ref 12.3–15.4)
EXPIRATION DATE: 0
GLUCOSE UR STRIP-MCNC: NEGATIVE MG/DL
HCT VFR BLD AUTO: 36.7 % (ref 34–46.6)
HGB BLD-MCNC: 12 G/DL (ref 12–15.9)
IMM GRANULOCYTES # BLD AUTO: 0.08 10*3/MM3 (ref 0–0.05)
IMM GRANULOCYTES NFR BLD AUTO: 0.8 % (ref 0–0.5)
LYMPHOCYTES # BLD AUTO: 2.34 10*3/MM3 (ref 0.7–3.1)
LYMPHOCYTES NFR BLD AUTO: 22.9 % (ref 19.6–45.3)
Lab: 0
MCH RBC QN AUTO: 28.5 PG (ref 26.6–33)
MCHC RBC AUTO-ENTMCNC: 32.7 G/DL (ref 31.5–35.7)
MCV RBC AUTO: 87.2 FL (ref 79–97)
MONOCYTES # BLD AUTO: 0.79 10*3/MM3 (ref 0.1–0.9)
MONOCYTES NFR BLD AUTO: 7.7 % (ref 5–12)
NEUTROPHILS NFR BLD AUTO: 6.8 10*3/MM3 (ref 1.7–7)
NEUTROPHILS NFR BLD AUTO: 66.7 % (ref 42.7–76)
NRBC BLD AUTO-RTO: 0 /100 WBC (ref 0–0.2)
PLATELET # BLD AUTO: 211 10*3/MM3 (ref 140–450)
PMV BLD AUTO: 10.4 FL (ref 6–12)
PROT UR STRIP-MCNC: NEGATIVE MG/DL
RBC # BLD AUTO: 4.21 10*6/MM3 (ref 3.77–5.28)
RH BLD: POSITIVE
SARS-COV-2 RNA PNL SPEC NAA+PROBE: NOT DETECTED
T&S EXPIRATION DATE: NORMAL
WBC # BLD AUTO: 10.2 10*3/MM3 (ref 3.4–10.8)

## 2021-06-01 PROCEDURE — 86901 BLOOD TYPING SEROLOGIC RH(D): CPT

## 2021-06-01 PROCEDURE — 85025 COMPLETE CBC W/AUTO DIFF WBC: CPT

## 2021-06-01 PROCEDURE — 86900 BLOOD TYPING SEROLOGIC ABO: CPT

## 2021-06-01 PROCEDURE — 36415 COLL VENOUS BLD VENIPUNCTURE: CPT

## 2021-06-01 PROCEDURE — U0004 COV-19 TEST NON-CDC HGH THRU: HCPCS

## 2021-06-01 PROCEDURE — 86850 RBC ANTIBODY SCREEN: CPT

## 2021-06-01 PROCEDURE — 0502F SUBSEQUENT PRENATAL CARE: CPT | Performed by: OBSTETRICS & GYNECOLOGY

## 2021-06-01 PROCEDURE — C9803 HOPD COVID-19 SPEC COLLECT: HCPCS

## 2021-06-01 RX ORDER — BIOTIN 10 MG
1 TABLET ORAL
COMMUNITY
End: 2021-06-07 | Stop reason: HOSPADM

## 2021-06-01 RX ORDER — SODIUM CHLORIDE 0.9 % (FLUSH) 0.9 %
10 SYRINGE (ML) INJECTION AS NEEDED
Status: CANCELLED | OUTPATIENT
Start: 2021-06-01

## 2021-06-01 RX ORDER — SODIUM CHLORIDE, SODIUM LACTATE, POTASSIUM CHLORIDE, CALCIUM CHLORIDE 600; 310; 30; 20 MG/100ML; MG/100ML; MG/100ML; MG/100ML
125 INJECTION, SOLUTION INTRAVENOUS CONTINUOUS
Status: CANCELLED | OUTPATIENT
Start: 2021-06-01

## 2021-06-01 RX ORDER — KETOROLAC TROMETHAMINE 30 MG/ML
30 INJECTION, SOLUTION INTRAMUSCULAR; INTRAVENOUS ONCE
Status: CANCELLED | OUTPATIENT
Start: 2021-06-01 | End: 2021-06-01

## 2021-06-01 RX ORDER — LIDOCAINE HYDROCHLORIDE 10 MG/ML
5 INJECTION, SOLUTION EPIDURAL; INFILTRATION; INTRACAUDAL; PERINEURAL AS NEEDED
Status: CANCELLED | OUTPATIENT
Start: 2021-06-01

## 2021-06-01 RX ORDER — CEFAZOLIN SODIUM 2 G/100ML
2 INJECTION, SOLUTION INTRAVENOUS ONCE
Status: CANCELLED | OUTPATIENT
Start: 2021-06-01 | End: 2021-06-01

## 2021-06-01 RX ORDER — MISOPROSTOL 200 UG/1
800 TABLET ORAL AS NEEDED
Status: CANCELLED | OUTPATIENT
Start: 2021-06-01

## 2021-06-01 RX ORDER — TRISODIUM CITRATE DIHYDRATE AND CITRIC ACID MONOHYDRATE 500; 334 MG/5ML; MG/5ML
30 SOLUTION ORAL ONCE
Status: CANCELLED | OUTPATIENT
Start: 2021-06-01 | End: 2021-06-01

## 2021-06-01 RX ORDER — OXYTOCIN-SODIUM CHLORIDE 0.9% IV SOLN 30 UNIT/500ML 30-0.9/5 UT/ML-%
85 SOLUTION INTRAVENOUS ONCE
Status: CANCELLED | OUTPATIENT
Start: 2021-06-01 | End: 2021-06-01

## 2021-06-01 RX ORDER — SODIUM CHLORIDE 0.9 % (FLUSH) 0.9 %
3 SYRINGE (ML) INJECTION EVERY 12 HOURS SCHEDULED
Status: CANCELLED | OUTPATIENT
Start: 2021-06-01

## 2021-06-01 RX ORDER — ACETAMINOPHEN 500 MG
1000 TABLET ORAL ONCE
Status: CANCELLED | OUTPATIENT
Start: 2021-06-01 | End: 2021-06-01

## 2021-06-01 RX ORDER — CARBOPROST TROMETHAMINE 250 UG/ML
250 INJECTION, SOLUTION INTRAMUSCULAR AS NEEDED
Status: CANCELLED | OUTPATIENT
Start: 2021-06-01

## 2021-06-01 RX ORDER — METHYLERGONOVINE MALEATE 0.2 MG/ML
200 INJECTION INTRAVENOUS ONCE AS NEEDED
Status: CANCELLED | OUTPATIENT
Start: 2021-06-01

## 2021-06-01 RX ORDER — OXYTOCIN-SODIUM CHLORIDE 0.9% IV SOLN 30 UNIT/500ML 30-0.9/5 UT/ML-%
650 SOLUTION INTRAVENOUS ONCE
Status: CANCELLED | OUTPATIENT
Start: 2021-06-01 | End: 2021-06-01

## 2021-06-01 NOTE — DISCHARGE INSTRUCTIONS
Patient instructed to drink 20 ounces (or until full) of Gatorade and it needs to be completed 1 hour before given arrival time for procedure (NO RED Gatorade)    Instructed patient to take two Tylenol extra strength (total of 1000 mg) the night before surgery.    What to know before your arrive:     -Do not eat, drink or chew gum beginning 8 hours before your scheduled arrival time to the hospital.  Except please drink 20 ounches of Gatorade and complete two hours before your  Given arrival time to the hospital.  If you drink too close to surgery time, your sugery may  Be delayed or cancelled.  Please complete as instructed.    -Do not shave any part of your body including abdomen or pelvic are for two    days before your procedure.   -If you are taking a scheduled medication (insulin, blood pressure medicine,   antibiotics) please consult with your physician whether to take on the day of   surgery.   -Remove all jewelry including rings, wedding bands, and piercing before coming   to the hospital.   -Leave important valuables at home.   -Do not wear dark fingernail polish.   -Please take two Tylenol 500 mg tablets the evening before surgery.   -Bring the following with you to the hospital:    -Picture ID and insurance, Medicare or Medicaid cards    -Co-pay/deductible required by insurance (Cash, Check, Credit Card)    -Copy of living will or power  document (if applicable)    -CPAP mask and tubing, not machine (if applicable)    -Skin prep instructions sheet    What to know the day of procedure:     -Park in the [facility], take elevator for first floor, exit to the right and  proceed through the doors to outside, follow the covered sidewalk to the  [facility], follow the hallway and signs to the [facility],  enter the [facility] to your right BEFORE entering the [address] lobby.  Take the  elevators to the 3rd floor ([address]).   -Leave unnecessary items in your vehicle, including  your suitcase.  Your support  person or a family member can get it for you after your procedure.   -Check in at the reception desk in the lobby of the 3rd floor ([facility] [facility]).   -One person may accompany you to the pre-op/recovery area.  Please have  other family members wait in the waiting room.   -An anesthesiologist will meet with your prior to your procedure.   -After anesthesia has been initiated, one person may accompany you in the  operating room.   -No video cameras are permitted in the operating room; only still cameras,  Please.      What to expect while you are in recovery:     -One person may stay with you while you are in recovery.   -If the baby is stable, he/she may visit to initiate breastfeeding & [facility].    THE FOLLOWING INFORMATION WAS PROVIDED TO PATIENT:     SECTION BOOKLET BY ANITA  PAIN MANAGEMENT   [facility]    CHLORHEXIDINE GLUCONATE WIPES AND INSTRUCTIONS GIVEN TO PATIENT

## 2021-06-01 NOTE — PAT
Patient instructed to drink 20 ounces (or until full) of Gatorade and it needs to be completed 1 hour before given arrival time for procedure (NO RED Gatorade)    Instructed patient to take two Tylenol extra strength (total of 1000 mg) the night before surgery.    An arrival time for procedure was not given during PAT visit. If patient had any questions or concerns about their arrival time, they were instructed to contact their surgeon/physician.  Additionally, if the patient referred to an arrival time that was acquired from their my chart account, patient was encouraged to verify that time with their surgeon/physician.  NO arrival times given in Pre Admission Testing Department.

## 2021-06-01 NOTE — PROGRESS NOTES
OB FOLLOW UP    Thomas Noriega is a 30 y.o.  38w4d patient being seen today for her obstetrical follow up visit. Patient reports irregular contractions.     Her prenatal care is complicated by (and status) : none    ROS -   Contractions irregular   problems denies  GI problems denies  Bleeding or Leaking denies  Fetal Movement : present      Current Outpatient Medications:   •  Biotin 10 MG tablet, Take 1 tablet/day by mouth., Disp: , Rfl:   •  Prenatal Vit-Fe Fumarate-FA (PRENATAL ) 27-1 MG tablet tablet, Take 1 tablet by mouth Daily., Disp: , Rfl:   •  Probiotic Product (PROBIOTIC-10 PO), Take 1 capsule by mouth Daily., Disp: , Rfl:     /76   Wt 82.4 kg (181 lb 9.6 oz)   LMP 2020   BMI 31.39 kg/m²     FHT:  140 BPM    Uterine Size: size equals dates   Presentations: cephalic        Uterus-nontender   Assessment    1. Pregnancy at 38w4d  2. Fetal status reassuring     Problem List Items Addressed This Visit        Gravid and     Previous  section    38 weeks gestation of pregnancy - Primary    Relevant Orders    POC Glucose, Urine, Qualitative, Dipstick (Completed)    POC Protein, Urine, Qualitative, Dipstick (Completed)          Plan    1. P/patient return in 3 days  2. Prenatal teaching done and patient questions were answered  3. Repeat  and tubal  at noon , arrive 1030    Colby Grier MD  2021  
No significant past surgical history

## 2021-06-04 ENCOUNTER — ANESTHESIA (OUTPATIENT)
Dept: LABOR AND DELIVERY | Facility: HOSPITAL | Age: 31
End: 2021-06-04

## 2021-06-04 ENCOUNTER — ANESTHESIA EVENT (OUTPATIENT)
Dept: LABOR AND DELIVERY | Facility: HOSPITAL | Age: 31
End: 2021-06-04

## 2021-06-04 ENCOUNTER — HOSPITAL ENCOUNTER (INPATIENT)
Facility: HOSPITAL | Age: 31
LOS: 3 days | Discharge: HOME | End: 2021-06-07
Attending: OBSTETRICS & GYNECOLOGY | Admitting: OBSTETRICS & GYNECOLOGY

## 2021-06-04 DIAGNOSIS — Z98.891 PREVIOUS CESAREAN SECTION: ICD-10-CM

## 2021-06-04 DIAGNOSIS — Z34.90 PREGNANCY, UNSPECIFIED GESTATIONAL AGE: ICD-10-CM

## 2021-06-04 DIAGNOSIS — Z30.2 STERILIZATION: ICD-10-CM

## 2021-06-04 PROBLEM — O34.219 PREVIOUS CESAREAN DELIVERY AFFECTING PREGNANCY: Status: ACTIVE | Noted: 2021-06-04

## 2021-06-04 PROCEDURE — 59510 CESAREAN DELIVERY: CPT | Performed by: OBSTETRICS & GYNECOLOGY

## 2021-06-04 PROCEDURE — 0UB70ZZ EXCISION OF BILATERAL FALLOPIAN TUBES, OPEN APPROACH: ICD-10-PCS | Performed by: OBSTETRICS & GYNECOLOGY

## 2021-06-04 PROCEDURE — 25010000002 METOCLOPRAMIDE PER 10 MG: Performed by: NURSE ANESTHETIST, CERTIFIED REGISTERED

## 2021-06-04 PROCEDURE — 25010000002 KETOROLAC TROMETHAMINE PER 15 MG: Performed by: OBSTETRICS & GYNECOLOGY

## 2021-06-04 PROCEDURE — 25010000002 MIDAZOLAM PER 1 MG: Performed by: NURSE ANESTHETIST, CERTIFIED REGISTERED

## 2021-06-04 PROCEDURE — 58611 LIGATE OVIDUCT(S) ADD-ON: CPT | Performed by: OBSTETRICS & GYNECOLOGY

## 2021-06-04 PROCEDURE — 25010000002 FENTANYL CITRATE (PF) 50 MCG/ML SOLUTION: Performed by: NURSE ANESTHETIST, CERTIFIED REGISTERED

## 2021-06-04 PROCEDURE — 25010000002 ONDANSETRON PER 1 MG: Performed by: NURSE ANESTHETIST, CERTIFIED REGISTERED

## 2021-06-04 PROCEDURE — 25010000002 KETOROLAC TROMETHAMINE PER 15 MG: Performed by: NURSE PRACTITIONER

## 2021-06-04 PROCEDURE — 25010000003 CEFAZOLIN IN DEXTROSE 2-4 GM/100ML-% SOLUTION: Performed by: NURSE PRACTITIONER

## 2021-06-04 PROCEDURE — S0260 H&P FOR SURGERY: HCPCS | Performed by: OBSTETRICS & GYNECOLOGY

## 2021-06-04 PROCEDURE — 88302 TISSUE EXAM BY PATHOLOGIST: CPT | Performed by: OBSTETRICS & GYNECOLOGY

## 2021-06-04 PROCEDURE — 25010000003 MORPHINE PER 10 MG: Performed by: NURSE ANESTHETIST, CERTIFIED REGISTERED

## 2021-06-04 PROCEDURE — 59025 FETAL NON-STRESS TEST: CPT

## 2021-06-04 RX ORDER — ONDANSETRON 4 MG/1
4 TABLET, FILM COATED ORAL EVERY 6 HOURS PRN
Status: DISCONTINUED | OUTPATIENT
Start: 2021-06-04 | End: 2021-06-07 | Stop reason: HOSPADM

## 2021-06-04 RX ORDER — CEFAZOLIN SODIUM 2 G/100ML
2 INJECTION, SOLUTION INTRAVENOUS ONCE
Status: COMPLETED | OUTPATIENT
Start: 2021-06-04 | End: 2021-06-04

## 2021-06-04 RX ORDER — SODIUM CHLORIDE, SODIUM LACTATE, POTASSIUM CHLORIDE, CALCIUM CHLORIDE 600; 310; 30; 20 MG/100ML; MG/100ML; MG/100ML; MG/100ML
125 INJECTION, SOLUTION INTRAVENOUS CONTINUOUS
Status: DISCONTINUED | OUTPATIENT
Start: 2021-06-04 | End: 2021-06-07 | Stop reason: HOSPADM

## 2021-06-04 RX ORDER — OXYTOCIN-SODIUM CHLORIDE 0.9% IV SOLN 30 UNIT/500ML 30-0.9/5 UT/ML-%
SOLUTION INTRAVENOUS AS NEEDED
Status: DISCONTINUED | OUTPATIENT
Start: 2021-06-04 | End: 2021-06-04 | Stop reason: SURG

## 2021-06-04 RX ORDER — SIMETHICONE 80 MG
80 TABLET,CHEWABLE ORAL 4 TIMES DAILY PRN
Status: DISCONTINUED | OUTPATIENT
Start: 2021-06-04 | End: 2021-06-07 | Stop reason: HOSPADM

## 2021-06-04 RX ORDER — TRISODIUM CITRATE DIHYDRATE AND CITRIC ACID MONOHYDRATE 500; 334 MG/5ML; MG/5ML
30 SOLUTION ORAL ONCE
Status: COMPLETED | OUTPATIENT
Start: 2021-06-04 | End: 2021-06-04

## 2021-06-04 RX ORDER — CALCIUM CARBONATE 200(500)MG
1 TABLET,CHEWABLE ORAL EVERY 4 HOURS PRN
Status: DISCONTINUED | OUTPATIENT
Start: 2021-06-04 | End: 2021-06-07 | Stop reason: HOSPADM

## 2021-06-04 RX ORDER — METHYLERGONOVINE MALEATE 0.2 MG/ML
200 INJECTION INTRAVENOUS ONCE AS NEEDED
Status: DISCONTINUED | OUTPATIENT
Start: 2021-06-04 | End: 2021-06-04 | Stop reason: HOSPADM

## 2021-06-04 RX ORDER — ONDANSETRON 2 MG/ML
4 INJECTION INTRAMUSCULAR; INTRAVENOUS EVERY 6 HOURS PRN
Status: DISCONTINUED | OUTPATIENT
Start: 2021-06-04 | End: 2021-06-07 | Stop reason: HOSPADM

## 2021-06-04 RX ORDER — MISOPROSTOL 200 UG/1
800 TABLET ORAL AS NEEDED
Status: DISCONTINUED | OUTPATIENT
Start: 2021-06-04 | End: 2021-06-04 | Stop reason: HOSPADM

## 2021-06-04 RX ORDER — OXYCODONE HYDROCHLORIDE 5 MG/1
10 TABLET ORAL EVERY 6 HOURS PRN
Status: DISCONTINUED | OUTPATIENT
Start: 2021-06-04 | End: 2021-06-07 | Stop reason: HOSPADM

## 2021-06-04 RX ORDER — ACETAMINOPHEN 500 MG
1000 TABLET ORAL ONCE
COMMUNITY
End: 2021-06-07 | Stop reason: HOSPADM

## 2021-06-04 RX ORDER — DOCUSATE SODIUM 100 MG/1
100 CAPSULE, LIQUID FILLED ORAL 2 TIMES DAILY PRN
Status: DISCONTINUED | OUTPATIENT
Start: 2021-06-04 | End: 2021-06-07 | Stop reason: HOSPADM

## 2021-06-04 RX ORDER — ACETAMINOPHEN 500 MG
1000 TABLET ORAL EVERY 6 HOURS
Status: COMPLETED | OUTPATIENT
Start: 2021-06-04 | End: 2021-06-05

## 2021-06-04 RX ORDER — PROMETHAZINE HYDROCHLORIDE 25 MG/1
25 TABLET ORAL ONCE AS NEEDED
Status: DISCONTINUED | OUTPATIENT
Start: 2021-06-04 | End: 2021-06-07 | Stop reason: HOSPADM

## 2021-06-04 RX ORDER — ACETAMINOPHEN 325 MG/1
650 TABLET ORAL EVERY 6 HOURS
Status: DISCONTINUED | OUTPATIENT
Start: 2021-06-05 | End: 2021-06-07 | Stop reason: HOSPADM

## 2021-06-04 RX ORDER — KETOROLAC TROMETHAMINE 30 MG/ML
30 INJECTION, SOLUTION INTRAMUSCULAR; INTRAVENOUS ONCE
Status: COMPLETED | OUTPATIENT
Start: 2021-06-04 | End: 2021-06-04

## 2021-06-04 RX ORDER — METOCLOPRAMIDE HYDROCHLORIDE 5 MG/ML
INJECTION INTRAMUSCULAR; INTRAVENOUS AS NEEDED
Status: DISCONTINUED | OUTPATIENT
Start: 2021-06-04 | End: 2021-06-04 | Stop reason: SURG

## 2021-06-04 RX ORDER — LIDOCAINE HYDROCHLORIDE 10 MG/ML
5 INJECTION, SOLUTION EPIDURAL; INFILTRATION; INTRACAUDAL; PERINEURAL AS NEEDED
Status: DISCONTINUED | OUTPATIENT
Start: 2021-06-04 | End: 2021-06-04 | Stop reason: HOSPADM

## 2021-06-04 RX ORDER — ONDANSETRON 2 MG/ML
INJECTION INTRAMUSCULAR; INTRAVENOUS AS NEEDED
Status: DISCONTINUED | OUTPATIENT
Start: 2021-06-04 | End: 2021-06-04 | Stop reason: SURG

## 2021-06-04 RX ORDER — SODIUM CHLORIDE 0.9 % (FLUSH) 0.9 %
3 SYRINGE (ML) INJECTION EVERY 12 HOURS SCHEDULED
Status: DISCONTINUED | OUTPATIENT
Start: 2021-06-04 | End: 2021-06-04 | Stop reason: HOSPADM

## 2021-06-04 RX ORDER — FENTANYL CITRATE 50 UG/ML
INJECTION, SOLUTION INTRAMUSCULAR; INTRAVENOUS AS NEEDED
Status: DISCONTINUED | OUTPATIENT
Start: 2021-06-04 | End: 2021-06-04 | Stop reason: SURG

## 2021-06-04 RX ORDER — OXYTOCIN-SODIUM CHLORIDE 0.9% IV SOLN 30 UNIT/500ML 30-0.9/5 UT/ML-%
85 SOLUTION INTRAVENOUS ONCE
Status: DISCONTINUED | OUTPATIENT
Start: 2021-06-04 | End: 2021-06-04 | Stop reason: HOSPADM

## 2021-06-04 RX ORDER — BUPIVACAINE HYDROCHLORIDE 7.5 MG/ML
INJECTION, SOLUTION INTRASPINAL AS NEEDED
Status: DISCONTINUED | OUTPATIENT
Start: 2021-06-04 | End: 2021-06-04 | Stop reason: SURG

## 2021-06-04 RX ORDER — KETOROLAC TROMETHAMINE 15 MG/ML
15 INJECTION, SOLUTION INTRAMUSCULAR; INTRAVENOUS EVERY 6 HOURS
Status: DISCONTINUED | OUTPATIENT
Start: 2021-06-04 | End: 2021-06-05

## 2021-06-04 RX ORDER — METOCLOPRAMIDE 10 MG/1
10 TABLET ORAL ONCE AS NEEDED
Status: DISCONTINUED | OUTPATIENT
Start: 2021-06-04 | End: 2021-06-07 | Stop reason: HOSPADM

## 2021-06-04 RX ORDER — FAMOTIDINE 10 MG/ML
INJECTION, SOLUTION INTRAVENOUS AS NEEDED
Status: DISCONTINUED | OUTPATIENT
Start: 2021-06-04 | End: 2021-06-04 | Stop reason: SURG

## 2021-06-04 RX ORDER — MIDAZOLAM HYDROCHLORIDE 1 MG/ML
INJECTION INTRAMUSCULAR; INTRAVENOUS AS NEEDED
Status: DISCONTINUED | OUTPATIENT
Start: 2021-06-04 | End: 2021-06-04 | Stop reason: SURG

## 2021-06-04 RX ORDER — DIPHENHYDRAMINE HCL 25 MG
25 CAPSULE ORAL EVERY 4 HOURS PRN
Status: DISCONTINUED | OUTPATIENT
Start: 2021-06-04 | End: 2021-06-07 | Stop reason: HOSPADM

## 2021-06-04 RX ORDER — NALBUPHINE HCL 10 MG/ML
3 AMPUL (ML) INJECTION
Status: CANCELLED | OUTPATIENT
Start: 2021-06-04

## 2021-06-04 RX ORDER — HYDROCORTISONE 25 MG/G
1 CREAM TOPICAL AS NEEDED
Status: DISCONTINUED | OUTPATIENT
Start: 2021-06-04 | End: 2021-06-07 | Stop reason: HOSPADM

## 2021-06-04 RX ORDER — MORPHINE SULFATE 0.5 MG/ML
INJECTION, SOLUTION EPIDURAL; INTRATHECAL; INTRAVENOUS AS NEEDED
Status: DISCONTINUED | OUTPATIENT
Start: 2021-06-04 | End: 2021-06-04 | Stop reason: SURG

## 2021-06-04 RX ORDER — PRENATAL VIT/IRON FUM/FOLIC AC 27MG-0.8MG
1 TABLET ORAL DAILY
Status: DISCONTINUED | OUTPATIENT
Start: 2021-06-04 | End: 2021-06-07 | Stop reason: HOSPADM

## 2021-06-04 RX ORDER — SODIUM CHLORIDE 0.9 % (FLUSH) 0.9 %
10 SYRINGE (ML) INJECTION AS NEEDED
Status: DISCONTINUED | OUTPATIENT
Start: 2021-06-04 | End: 2021-06-04 | Stop reason: HOSPADM

## 2021-06-04 RX ORDER — OXYTOCIN-SODIUM CHLORIDE 0.9% IV SOLN 30 UNIT/500ML 30-0.9/5 UT/ML-%
650 SOLUTION INTRAVENOUS ONCE
Status: DISCONTINUED | OUTPATIENT
Start: 2021-06-04 | End: 2021-06-04 | Stop reason: HOSPADM

## 2021-06-04 RX ORDER — CARBOPROST TROMETHAMINE 250 UG/ML
250 INJECTION, SOLUTION INTRAMUSCULAR AS NEEDED
Status: DISCONTINUED | OUTPATIENT
Start: 2021-06-04 | End: 2021-06-04 | Stop reason: HOSPADM

## 2021-06-04 RX ORDER — IBUPROFEN 600 MG/1
600 TABLET ORAL EVERY 6 HOURS
Status: DISCONTINUED | OUTPATIENT
Start: 2021-06-05 | End: 2021-06-05

## 2021-06-04 RX ORDER — LANOLIN
CREAM (ML) TOPICAL
Status: DISCONTINUED | OUTPATIENT
Start: 2021-06-04 | End: 2021-06-07 | Stop reason: HOSPADM

## 2021-06-04 RX ORDER — ACETAMINOPHEN 500 MG
1000 TABLET ORAL ONCE
Status: COMPLETED | OUTPATIENT
Start: 2021-06-04 | End: 2021-06-04

## 2021-06-04 RX ORDER — ALUMINA, MAGNESIA, AND SIMETHICONE 2400; 2400; 240 MG/30ML; MG/30ML; MG/30ML
15 SUSPENSION ORAL EVERY 4 HOURS PRN
Status: DISCONTINUED | OUTPATIENT
Start: 2021-06-04 | End: 2021-06-07 | Stop reason: HOSPADM

## 2021-06-04 RX ORDER — PROMETHAZINE HYDROCHLORIDE 12.5 MG/1
12.5 SUPPOSITORY RECTAL ONCE AS NEEDED
Status: DISCONTINUED | OUTPATIENT
Start: 2021-06-04 | End: 2021-06-07 | Stop reason: HOSPADM

## 2021-06-04 RX ORDER — OXYCODONE HYDROCHLORIDE 5 MG/1
5 TABLET ORAL EVERY 6 HOURS PRN
Status: DISCONTINUED | OUTPATIENT
Start: 2021-06-04 | End: 2021-06-07 | Stop reason: HOSPADM

## 2021-06-04 RX ORDER — HYDROMORPHONE HYDROCHLORIDE 1 MG/ML
0.5 INJECTION, SOLUTION INTRAMUSCULAR; INTRAVENOUS; SUBCUTANEOUS
Status: CANCELLED | OUTPATIENT
Start: 2021-06-04 | End: 2021-06-05

## 2021-06-04 RX ADMIN — MIDAZOLAM 1 MG: 1 INJECTION INTRAMUSCULAR; INTRAVENOUS at 12:07

## 2021-06-04 RX ADMIN — METOCLOPRAMIDE 10 MG: 5 INJECTION, SOLUTION INTRAMUSCULAR; INTRAVENOUS at 12:12

## 2021-06-04 RX ADMIN — SODIUM CHLORIDE, POTASSIUM CHLORIDE, SODIUM LACTATE AND CALCIUM CHLORIDE 1000 ML: 600; 310; 30; 20 INJECTION, SOLUTION INTRAVENOUS at 11:45

## 2021-06-04 RX ADMIN — ACETAMINOPHEN 1000 MG: 500 TABLET, FILM COATED ORAL at 11:52

## 2021-06-04 RX ADMIN — SODIUM CHLORIDE, POTASSIUM CHLORIDE, SODIUM LACTATE AND CALCIUM CHLORIDE: 600; 310; 30; 20 INJECTION, SOLUTION INTRAVENOUS at 12:06

## 2021-06-04 RX ADMIN — FAMOTIDINE 20 MG: 10 INJECTION, SOLUTION INTRAVENOUS at 12:07

## 2021-06-04 RX ADMIN — ONDANSETRON 4 MG: 2 INJECTION INTRAMUSCULAR; INTRAVENOUS at 12:07

## 2021-06-04 RX ADMIN — ACETAMINOPHEN 1000 MG: 500 TABLET, FILM COATED ORAL at 23:40

## 2021-06-04 RX ADMIN — FENTANYL CITRATE 15 MCG: 50 INJECTION, SOLUTION INTRAMUSCULAR; INTRAVENOUS at 12:13

## 2021-06-04 RX ADMIN — CEFAZOLIN SODIUM 2 G: 2 INJECTION, SOLUTION INTRAVENOUS at 12:01

## 2021-06-04 RX ADMIN — SODIUM CITRATE AND CITRIC ACID MONOHYDRATE 30 ML: 500; 334 SOLUTION ORAL at 12:03

## 2021-06-04 RX ADMIN — OXYTOCIN 500 ML: 10 INJECTION INTRAVENOUS at 13:14

## 2021-06-04 RX ADMIN — DOCUSATE SODIUM 100 MG: 100 CAPSULE, LIQUID FILLED ORAL at 20:06

## 2021-06-04 RX ADMIN — MORPHINE SULFATE 150 MCG: 0.5 INJECTION, SOLUTION EPIDURAL; INTRATHECAL; INTRAVENOUS at 12:13

## 2021-06-04 RX ADMIN — ACETAMINOPHEN 1000 MG: 500 TABLET, FILM COATED ORAL at 18:25

## 2021-06-04 RX ADMIN — SIMETHICONE 80 MG: 80 TABLET, CHEWABLE ORAL at 20:06

## 2021-06-04 RX ADMIN — BUPIVACAINE HYDROCHLORIDE IN DEXTROSE 1.3 MG: 7.5 INJECTION, SOLUTION SUBARACHNOID at 12:13

## 2021-06-04 RX ADMIN — SODIUM CHLORIDE, POTASSIUM CHLORIDE, SODIUM LACTATE AND CALCIUM CHLORIDE 125 ML/HR: 600; 310; 30; 20 INJECTION, SOLUTION INTRAVENOUS at 17:32

## 2021-06-04 RX ADMIN — KETOROLAC TROMETHAMINE 30 MG: 30 INJECTION, SOLUTION INTRAMUSCULAR; INTRAVENOUS at 14:27

## 2021-06-04 RX ADMIN — KETOROLAC TROMETHAMINE 15 MG: 15 INJECTION, SOLUTION INTRAMUSCULAR; INTRAVENOUS at 20:06

## 2021-06-04 NOTE — ANESTHESIA POSTPROCEDURE EVALUATION
Patient: Thoams Noriega    Procedure Summary     Date: 21 Room / Location: [facility] LABOR DELIVERY   [facility] LABOR DELIVERY    Anesthesia Start: 1206 Anesthesia Stop: 1342    Procedure:  SECTION REPEAT (N/A Abdomen) Diagnosis:     Surgeons: Colby Grier MD Provider: Darrian Gray DO    Anesthesia Type: spinal, ITN ASA Status: 2          Anesthesia Type: spinal, ITN    Vitals  Vitals Value Taken Time   BP 1208//82    Temp 97.6    Pulse 70 21 1341   Resp 17    SpO2 100 % 21 1341   Vitals shown include unvalidated device data.        Post Anesthesia Care and Evaluation    Patient location during evaluation: bedside  Patient participation: complete - patient participated  Level of consciousness: awake and alert  Pain management: adequate  Airway patency: patent  Anesthetic complications: No anesthetic complications    Cardiovascular status: acceptable  Respiratory status: acceptable  Hydration status: acceptable

## 2021-06-04 NOTE — L&D DELIVERY NOTE
Operative Note    Patient name: Thomas Noriega  YOB: 1990   MRN: [id number]  Admission Date: 2021  Referring Provider: Colby Grier MD    ID: 30 y.o.  at 39w0d    Preoperative Diagnosis:   Previous  section  Desires permanent sterilization  Patient Active Problem List   Diagnosis   • Previous  section   • 36 weeks gestation of pregnancy   • Request for sterilization   • 38 weeks gestation of pregnancy   • Previous  delivery affecting pregnancy       Postoperative Diagnosis: Same as above.    Procedure(s): repeatlow transverse  delivery   Bilateral segmental salpingectomy, tubal sterilization   surgeons: Surgeon(s) and Role:     * Colby Grier MD - Primary     * Bo Georges MD - Resident - Assisting    Staff:  Surgeon(s):  Colby Grier MD Chilukuri, Pranaya, MD         Anesthesia: Spinal    Estimated Blood Loss: 500 mL    IV Fluids: [unfilled]    Preoperative antibiotic: Ancef (cefazolin) 2 grams    Blood products:   Blood Administration Record (From admission, onward)    None          Pathology:   Order Name Source Comment Collection Info Order Time   TISSUE PATHOLOGY EXAM Fallopian Tubes, Bilateral  Collected By: Colby Grier MD 2021 12:54 PM     Release to patient   Immediate            Drains: Carrion catheter to gravity    Complications: None    Condition: Stable to recovery room      Infant:                Gender: male  infant    Weight: 3420 g (7 lb 8.6 oz)     Apgars: 8  @ 1 minute /     9  @ 5 minutes    Cord gases: Venous:  No components found for:  PHCVEN,  BECVEN      Arterial:  No components found for:  PHCART,  BECART          Operative Summary:   After obtaining informed consent the patient was taken to the operating room where adequate anesthesia was obtained.  Carrion catheter was placed in the bladder preoperatively.  IV antibiotics were given preoperatively.       The abdomen was prepped and draped in  the usual sterile fashion for  delivery.  After confirming adequate anesthesia a Pfannenstiel skin incision was made with the scalpel and carried through to the underlying layer of fascia.  The fascia was incised in the midline and the incision extended laterally with the Tolbert scissors and with blunt dissection.       The upper aspect of the fascia was grasped with 2 Kocher clamps, elevated, and dissected off the underlying rectus muscles bluntly and with the Tolbert scissors.  The Kocher clamps were removed and applied to the inferior aspect of the fascia.  The fascia was dissected off of the rectus muscles in the same fashion.  The peritoneum was entered bluntly.  The incision was stretched and the bladder blade and Boyle retractor inserted for visualization of the uterus.      The uterus was incised with the scalpel in a low transverse fashion.  The uterine incision was entered digitally and the incision extended bluntly in a cranial-caudal fashion.  Retractors were removed and membranes were ruptured.  The infant was delivered atraumatically from cephalic presentation.  The umbilical cord was clamped and cut and the nose and mouth bulb suctioned.  The infant was handed off to waiting pediatric staff.      Cord blood gases were not collected.  Cord blood was collected.  The placenta was removed using cord traction and uterine massage.  The uterus was exteriorized and cleared of all clots and debris.  The uterine incision was repaired with 1-0 Chromic in a running locked fashion. A single-layer technique was used.  Additional hemostatic measures required: electrocautery.    Attention was turned to the tubal sterilization.  Each tube was easily isolated followed to the fimbriated end, doubled at the proximal one third.  The knuckle was tied with 0 plain suture x2.  Knuckles above the ties were cut and sent to the lab for confirming diagnosis.  Good hemostasis noted        The incision was inspected and  excellent hemostasis was noted.  The tubes and ovaries were noted to be normal. The uterus was returned to the abdomen.  The gutters were cleared of all clots and debris.  Irrigation was used.  The uterine incision was again inspected and found to be hemostatic.      The peritoneum was reapproximated with 2.0 Vicryl .  The fascia was closed with 0 Vicryl  in a running fashion (two segments).  The subcutaneous space was reapproximated using 3.0 Plain.      The skin was closed using subcuticular stitch with 3-0 Vicryl.  The patient was transferred to the recovery room in stable condition.            Colby Grier MD  6/4/2021  13:25 EDT

## 2021-06-04 NOTE — PLAN OF CARE
Problem: Breastfeeding  Goal: Effective Breastfeeding  Outcome: Ongoing, Progressing  Intervention: Promote Breast Care and Comfort  Flowsheets (Taken 6/4/2021 1615)  Breast Care: Breastfeeding: frequency of feeding adjusted  Intervention: Promote Effective Breastfeeding  Flowsheets (Taken 6/4/2021 1615)  Breastfeeding Assistance:   feeding cue recognition promoted   feeding on demand promoted  Parent/Child Attachment Promotion:   positive reinforcement provided   rooming-in promoted   skin-to-skin contact encouraged   participation in care promoted   parent/caregiver presence encouraged  Intervention: Support Exclusive Breastfeeding Success  Flowsheets (Taken 6/4/2021 1615)  Supportive Measures: positive reinforcement provided  Breastfeeding Support: lactation counseling provided   Goal Outcome Evaluation:

## 2021-06-04 NOTE — LACTATION NOTE
06/04/21 1700   Maternal Information   Date of Referral 06/04/21   Maternal Infant Feeding   Maternal Emotional State receptive;relaxed   Milk Expression/Equipment   Breast Pump Type double electric, personal     Mom has hx of hyperprolactinemia. States had a normal supply of milk with first child and nursed x 13 months. Teaching done. Enc to call for asst as needed.

## 2021-06-04 NOTE — ANESTHESIA PREPROCEDURE EVALUATION
Anesthesia Evaluation     Patient summary reviewed and Nursing notes reviewed   NPO Solid Status: > 8 hours  NPO Liquid Status: > 8 hours           Airway   Dental      Pulmonary - negative pulmonary ROS   Cardiovascular - negative cardio ROS        Neuro/Psych- negative ROS    ROS Comment: Benign pituitary adenoma  GI/Hepatic/Renal/Endo - negative ROS     Musculoskeletal (-) negative ROS    Abdominal    Substance History - negative use     OB/GYN    (+) Pregnant,     Comment: Previous c/s  Desires sterilization      Other                        Anesthesia Plan    ASA 2     spinal and ITN       Anesthetic plan, all risks, benefits, and alternatives have been provided, discussed and informed consent has been obtained with: patient.

## 2021-06-04 NOTE — ANESTHESIA PROCEDURE NOTES
Spinal Block      Patient reassessed immediately prior to procedure    Patient location during procedure: OR  Indication:at surgeon's request  Performed By  CRNA: Candi Castro CRNA  Preanesthetic Checklist  Completed: patient identified, IV checked, risks and benefits discussed, surgical consent, monitors and equipment checked, pre-op evaluation and timeout performed  Spinal Block Prep:  Patient Position:sitting  Sterile Tech:cap, gloves, mask and sterile barriers  Prep:Betadine  Patient Monitoring:blood pressure monitoring, continuous pulse oximetry and EKG  Spinal Block Procedure  Approach:midline  Guidance:palpation technique  Location:L3-L4  Needle Type:Michelle  Needle Gauge:25 G  Placement of Spinal needle event:cerebrospinal fluid aspirated  Paresthesia: no  Fluid Appearance:clear     Post Assessment  Patient Tolerance:patient tolerated the procedure well with no apparent complications  Complications no

## 2021-06-04 NOTE — H&P
[facility]  Thomas Loera  : 1990  MRN: [id number]  CSN: [id number]    History and Physical    Subjective   Thomas Loera is a 30 y.o. year old  with an Estimated Date of Delivery: 21 scheduled for  delivery due to previous C/S - declines .  Her placental location is posterior.  She is planning for sterilization at the time of the .    Prenatal care has been with Dr. Grier .  It has been benign.    OB History    Para Term  AB Living   2 1 1 0 0 1   SAB TAB Ectopic Molar Multiple Live Births   0 0 0 0 0 1      # Outcome Date GA Lbr Al/2nd Weight Sex Delivery Anes PTL Lv   2 Current            1 Term 18 39w5d  3549 g (7 lb 13.2 oz) F CS-LTranv EPI N RON      Complications: Fetal Intolerance      Name: JOSE LOERA      Apgar1: 7  Apgar5: 9     Past Medical History:   Diagnosis Date   • First pregnancy    • Papanicolaou smear 2018    normal   • Pituitary microadenoma (CMS/HCC) 2017    benign   • Pituitary microadenoma (CMS/HCC)      Past Surgical History:   Procedure Laterality Date   •  SECTION N/A 3/6/2018    Procedure:  SECTION PRIMARY;  Surgeon: Colby Grier MD;  Location: [facility] LABOR DELIVERY;  Service:    • WISDOM TOOTH EXTRACTION  2019     No current facility-administered medications for this encounter.    No Known Allergies    Social History    Tobacco Use      Smoking status: Never Smoker      Smokeless tobacco: Never Used    Review of Systems      Objective   LMP 2020   General: well developed; well nourished  no acute distress   Heart: Not performed.   Lungs: breathing is unlabored   Abdomen: soft, non-tender; no masses  no umbilical or inguinal hernias are present  no hepato-splenomegaly     Prenatal Labs  Lab Results   Component Value Date    HGB 12.0 2021    RUBELLAABIGG Positive 10/28/2020    HEPBSAG Non-Reactive 10/28/2020    ABSCRN Negative 2021    RZO6EDT8 Non-Reactive  10/28/2020    HEPCVIRUSABY Non-Reactive 10/28/2020    GCT 82 2021    HHE6DKUK 93 2017    STREPGPB  2018     No Group B Streptococcus Isolated from Broth Culture    URINECX Final report 10/28/2020    CHLAMNAA Negative 10/28/2020    NGONORRHON Negative 10/28/2020       Recent Labs  Lab Results   Component Value Date    HGB 12.0 2021    HCT 36.7 2021    WBC 10.20 2021     2021           Assessment   1. IUP with an Estimated Date of Delivery: 21  2. Planned  section for previous C/S - declines    3. Desires permanent sterilization       Plan   1. Repeat  with sterilization  2. ABx and DVT prophylaxis    Bo Georges MD  2021

## 2021-06-05 LAB
BASOPHILS # BLD AUTO: 0.04 10*3/MM3 (ref 0–0.2)
BASOPHILS NFR BLD AUTO: 0.4 % (ref 0–1.5)
DEPRECATED RDW RBC AUTO: 47.2 FL (ref 37–54)
EOSINOPHIL # BLD AUTO: 0.12 10*3/MM3 (ref 0–0.4)
EOSINOPHIL NFR BLD AUTO: 1.1 % (ref 0.3–6.2)
ERYTHROCYTE [DISTWIDTH] IN BLOOD BY AUTOMATED COUNT: 14.8 % (ref 12.3–15.4)
HCT VFR BLD AUTO: 32 % (ref 34–46.6)
HGB BLD-MCNC: 10.3 G/DL (ref 12–15.9)
IMM GRANULOCYTES # BLD AUTO: 0.06 10*3/MM3 (ref 0–0.05)
IMM GRANULOCYTES NFR BLD AUTO: 0.6 % (ref 0–0.5)
LYMPHOCYTES # BLD AUTO: 1.91 10*3/MM3 (ref 0.7–3.1)
LYMPHOCYTES NFR BLD AUTO: 18 % (ref 19.6–45.3)
MCH RBC QN AUTO: 28.1 PG (ref 26.6–33)
MCHC RBC AUTO-ENTMCNC: 32.2 G/DL (ref 31.5–35.7)
MCV RBC AUTO: 87.4 FL (ref 79–97)
MONOCYTES # BLD AUTO: 0.67 10*3/MM3 (ref 0.1–0.9)
MONOCYTES NFR BLD AUTO: 6.3 % (ref 5–12)
NEUTROPHILS NFR BLD AUTO: 7.8 10*3/MM3 (ref 1.7–7)
NEUTROPHILS NFR BLD AUTO: 73.6 % (ref 42.7–76)
NRBC BLD AUTO-RTO: 0 /100 WBC (ref 0–0.2)
PLATELET # BLD AUTO: 197 10*3/MM3 (ref 140–450)
PMV BLD AUTO: 10.6 FL (ref 6–12)
RBC # BLD AUTO: 3.66 10*6/MM3 (ref 3.77–5.28)
WBC # BLD AUTO: 10.6 10*3/MM3 (ref 3.4–10.8)

## 2021-06-05 PROCEDURE — 25010000002 KETOROLAC TROMETHAMINE PER 15 MG: Performed by: OBSTETRICS & GYNECOLOGY

## 2021-06-05 PROCEDURE — 0503F POSTPARTUM CARE VISIT: CPT | Performed by: NURSE PRACTITIONER

## 2021-06-05 PROCEDURE — 85025 COMPLETE CBC W/AUTO DIFF WBC: CPT | Performed by: OBSTETRICS & GYNECOLOGY

## 2021-06-05 RX ORDER — IBUPROFEN 600 MG/1
600 TABLET ORAL EVERY 6 HOURS SCHEDULED
Status: DISCONTINUED | OUTPATIENT
Start: 2021-06-05 | End: 2021-06-05

## 2021-06-05 RX ORDER — IBUPROFEN 600 MG/1
600 TABLET ORAL EVERY 6 HOURS SCHEDULED
Status: DISCONTINUED | OUTPATIENT
Start: 2021-06-05 | End: 2021-06-07 | Stop reason: HOSPADM

## 2021-06-05 RX ADMIN — ACETAMINOPHEN 1000 MG: 500 TABLET, FILM COATED ORAL at 11:47

## 2021-06-05 RX ADMIN — ACETAMINOPHEN 1000 MG: 500 TABLET, FILM COATED ORAL at 06:30

## 2021-06-05 RX ADMIN — PRENATAL VITAMINS-IRON FUMARATE 27 MG IRON-FOLIC ACID 0.8 MG TABLET 1 TABLET: at 10:24

## 2021-06-05 RX ADMIN — DOCUSATE SODIUM 100 MG: 100 CAPSULE, LIQUID FILLED ORAL at 20:14

## 2021-06-05 RX ADMIN — DOCUSATE SODIUM 100 MG: 100 CAPSULE, LIQUID FILLED ORAL at 11:47

## 2021-06-05 RX ADMIN — ACETAMINOPHEN 650 MG: 325 TABLET, FILM COATED ORAL at 18:38

## 2021-06-05 RX ADMIN — SIMETHICONE 80 MG: 80 TABLET, CHEWABLE ORAL at 18:38

## 2021-06-05 RX ADMIN — SIMETHICONE 80 MG: 80 TABLET, CHEWABLE ORAL at 10:24

## 2021-06-05 RX ADMIN — IBUPROFEN 600 MG: 600 TABLET, FILM COATED ORAL at 16:16

## 2021-06-05 RX ADMIN — KETOROLAC TROMETHAMINE 15 MG: 15 INJECTION, SOLUTION INTRAMUSCULAR; INTRAVENOUS at 04:04

## 2021-06-05 RX ADMIN — IBUPROFEN 600 MG: 600 TABLET, FILM COATED ORAL at 22:15

## 2021-06-05 RX ADMIN — Medication: at 10:24

## 2021-06-05 RX ADMIN — KETOROLAC TROMETHAMINE 15 MG: 15 INJECTION, SOLUTION INTRAMUSCULAR; INTRAVENOUS at 10:23

## 2021-06-05 NOTE — PROGRESS NOTES
2021    Name:Thomas Noriega    MR#:[id number]     PROGRESS NOTE:  Post-Op 1 S/P    HD:1    Subjective   30 y.o. yo Female  s/p CS at 39w0d doing well. Pain well controlled. Tolerating regular diet and having flatus. Lochia normal.     Patient Active Problem List   Diagnosis   • Previous  section   • 36 weeks gestation of pregnancy   • Request for sterilization   • 38 weeks gestation of pregnancy   • Previous  delivery affecting pregnancy        Objective    Vitals  Temp:  Temp:  [97.4 °F (36.3 °C)-98.9 °F (37.2 °C)] 98.9 °F (37.2 °C)  Temp src: Oral  BP:  BP: (117-130)/(70-90) 130/79  Pulse:  Heart Rate:  [56-80] 77  RR:   Resp:  [16-18] 18    General Awake, alert, no distress  Abdomen Soft, non-distended, fundus firm, below umbilicus, appropriately tender  Incision  Intact, no erythema or exudate  Extremities Calves NT bilaterally     I/O last 3 completed shifts:  In: 1500 [I.V.:1500]  Out: 2258 [Urine:2150; Blood:108]    LABS:   Lab Results   Component Value Date    WBC 10.60 2021    HGB 10.3 (L) 2021    HCT 32.0 (L) 2021    MCV 87.4 2021     2021       Infant: male       Assessment   1.  POD 1, doing well   2.  Baby boy well; desires circ    Plan: Routine postoperative care.  Advance      Active Problems:   None      Carisa Mcelroy, SHAUN  2021 10:06 EDT

## 2021-06-06 RX ADMIN — DOCUSATE SODIUM 100 MG: 100 CAPSULE, LIQUID FILLED ORAL at 21:55

## 2021-06-06 RX ADMIN — ACETAMINOPHEN 650 MG: 325 TABLET, FILM COATED ORAL at 17:51

## 2021-06-06 RX ADMIN — IBUPROFEN 600 MG: 600 TABLET, FILM COATED ORAL at 04:13

## 2021-06-06 RX ADMIN — IBUPROFEN 600 MG: 600 TABLET, FILM COATED ORAL at 15:20

## 2021-06-06 RX ADMIN — ACETAMINOPHEN 650 MG: 325 TABLET, FILM COATED ORAL at 12:34

## 2021-06-06 RX ADMIN — IBUPROFEN 600 MG: 600 TABLET, FILM COATED ORAL at 21:55

## 2021-06-06 RX ADMIN — IBUPROFEN 600 MG: 600 TABLET, FILM COATED ORAL at 09:40

## 2021-06-06 RX ADMIN — PRENATAL VITAMINS-IRON FUMARATE 27 MG IRON-FOLIC ACID 0.8 MG TABLET 1 TABLET: at 09:40

## 2021-06-06 RX ADMIN — SIMETHICONE 80 MG: 80 TABLET, CHEWABLE ORAL at 21:55

## 2021-06-06 RX ADMIN — DOCUSATE SODIUM 100 MG: 100 CAPSULE, LIQUID FILLED ORAL at 09:40

## 2021-06-06 RX ADMIN — ACETAMINOPHEN 650 MG: 325 TABLET, FILM COATED ORAL at 00:58

## 2021-06-06 RX ADMIN — ACETAMINOPHEN 650 MG: 325 TABLET, FILM COATED ORAL at 06:21

## 2021-06-06 RX ADMIN — SIMETHICONE 80 MG: 80 TABLET, CHEWABLE ORAL at 09:41

## 2021-06-06 NOTE — PROGRESS NOTES
2021    Name:Thomas Noriega    MR#:[id number]     PROGRESS NOTE:  Post-Op 2 S/P    HD:2    Subjective   30 y.o. yo Female  s/p CS at 39w0d doing well. Pain well controlled. Tolerating regular diet and having flatus. Lochia normal.     Patient Active Problem List   Diagnosis   • Previous  section   • 36 weeks gestation of pregnancy   • Request for sterilization   • 38 weeks gestation of pregnancy   • Previous  delivery affecting pregnancy        Objective    Vitals  Temp:  Temp:  [97.8 °F (36.6 °C)-98.6 °F (37 °C)] 97.8 °F (36.6 °C)  Temp src: Oral  BP:  BP: (117-138)/(67-84) 130/84  Pulse:  Heart Rate:  [80-88] 81  RR:   Resp:  [16-18] 18    General Awake, alert, no distress  Abdomen Soft, non-distended, fundus firm, below umbilicus, appropriately tender  Incision  Intact, no erythema or exudate.  Significant swelling and bruising of her mons.  Extremities Calves NT bilaterally     I/O last 3 completed shifts:  In: -   Out: 2900 [Urine:2900]    LABS:   Lab Results   Component Value Date    WBC 10.60 2021    HGB 10.3 (L) 2021    HCT 32.0 (L) 2021    MCV 87.4 2021     2021       Infant: male -Circumcision completed      Assessment   1.  POD 2    Plan: Doing well.  Routine postoperative care.  We discussed not sitting in a reclined position to avoid her vulva being the most dependent portion.  This is to help with the swelling.  Anticipate discharge tomorrow.      Active Problems:   None      Meghan Garrison MD  2021 14:43 EDT

## 2021-06-07 VITALS
HEIGHT: 62 IN | OXYGEN SATURATION: 93 % | DIASTOLIC BLOOD PRESSURE: 81 MMHG | RESPIRATION RATE: 18 BRPM | SYSTOLIC BLOOD PRESSURE: 131 MMHG | BODY MASS INDEX: 33.49 KG/M2 | WEIGHT: 182 LBS | TEMPERATURE: 98.1 F | HEART RATE: 86 BPM

## 2021-06-07 LAB
CYTO UR: NORMAL
LAB AP CASE REPORT: NORMAL
LAB AP CLINICAL INFORMATION: NORMAL
PATH REPORT.FINAL DX SPEC: NORMAL
PATH REPORT.GROSS SPEC: NORMAL

## 2021-06-07 PROCEDURE — 0503F POSTPARTUM CARE VISIT: CPT | Performed by: NURSE PRACTITIONER

## 2021-06-07 RX ORDER — IBUPROFEN 600 MG/1
600 TABLET ORAL EVERY 6 HOURS SCHEDULED
Qty: 30 TABLET | Refills: 0 | Status: SHIPPED | OUTPATIENT
Start: 2021-06-07 | End: 2022-07-20

## 2021-06-07 RX ADMIN — ACETAMINOPHEN 650 MG: 325 TABLET, FILM COATED ORAL at 12:11

## 2021-06-07 RX ADMIN — ACETAMINOPHEN 650 MG: 325 TABLET, FILM COATED ORAL at 05:50

## 2021-06-07 RX ADMIN — IBUPROFEN 600 MG: 600 TABLET, FILM COATED ORAL at 04:15

## 2021-06-07 RX ADMIN — PRENATAL VITAMINS-IRON FUMARATE 27 MG IRON-FOLIC ACID 0.8 MG TABLET 1 TABLET: at 08:36

## 2021-06-07 RX ADMIN — SIMETHICONE 80 MG: 80 TABLET, CHEWABLE ORAL at 08:36

## 2021-06-07 RX ADMIN — IBUPROFEN 600 MG: 600 TABLET, FILM COATED ORAL at 10:14

## 2021-06-07 RX ADMIN — DOCUSATE SODIUM 100 MG: 100 CAPSULE, LIQUID FILLED ORAL at 08:36

## 2021-06-07 RX ADMIN — ACETAMINOPHEN 650 MG: 325 TABLET, FILM COATED ORAL at 00:22

## 2021-06-07 NOTE — DISCHARGE SUMMARY
Discharge Summary    Date of Admission: 2021  Date of Discharge:  2021      Patient: Thomas Noriega      MR#:[id number]    Primary Surgeon/OB: Colby Grier MD    Discharge Surgeon/OB:    Presenting Problem/History of Present Illness  Previous  delivery affecting pregnancy [O34.219]     Patient Active Problem List   Diagnosis   • Previous  section   • 36 weeks gestation of pregnancy   • Request for sterilization   • 38 weeks gestation of pregnancy   • Previous  delivery affecting pregnancy         Discharge Diagnosis:  section at 39w0d    Procedures:  , Low Transverse     2021    12:41 PM        Rh Immune globulin given: no    Rubella vaccine given: no    Discharge Date: 2021; Discharge Time: 09:40 EDT    Early Discharge:  NO    Hospital Course  Patient is a 30 y.o. female  at 39w0d status post  section with uneventful postoperative recovery.  Patient was advanced to regular diet on postoperative day#1.  On discharge, ambulating, tolerating a regular diet without any difficulties and her incision is dry, clean and intact.     Infant:   male  fetus 3420 g (7 lb 8.6 oz)  with Apgar scores of 8 , 9  at five minutes.    Condition on Discharge:  Stable    Vital Signs  Temp:  [97.8 °F (36.6 °C)-98.9 °F (37.2 °C)] 98.1 °F (36.7 °C)  Heart Rate:  [79-88] 86  Resp:  [18] 18  BP: (129-136)/(71-84) 131/81    Lab Results   Component Value Date    WBC 10.60 2021    HGB 10.3 (L) 2021    HCT 32.0 (L) 2021    MCV 87.4 2021     2021       Discharge Disposition  Home or Self Care    Discharge Medications     Discharge Medications      New Medications      Instructions Start Date   ibuprofen 600 MG tablet  Commonly known as: ADVIL,MOTRIN   600 mg, Oral, Every 6 Hours Scheduled         Continue These Medications      Instructions Start Date   Prenatal 27-1 27-1 MG tablet tablet   1 tablet, Oral, Daily         Stop  These Medications    acetaminophen 500 MG tablet  Commonly known as: TYLENOL     Biotin 10 MG tablet     PROBIOTIC-10 PO            Discharge Diet:     Activity at Discharge:   Activity Instructions     Bathing Restrictions      Type of Restriction: Bathing    Bathing Restrictions: Other    Explain Bathing Restrictions: May shower    Driving Restrictions      No driving for 2 weeks post op    Type of Restriction: Driving    Driving Restrictions: No Driving    Lifting Restrictions      Type of Restriction: Lifting    Lifting Restrictions: Other    Explain Lifing Restrictions: Avoid lifting anything over 15 lbs during first several weeks post op    Sexual Activity Restrictions      Type of Restriction: Sex    Explain Sexual Activity Restrictions: Avoid intercourse until after 6 week post op visit    Work Restrictions      Type of Restriction: Work    May Return to Work: Other    Return to Work Instructions: May return to work in 6 to 8 weeks post op          Follow-up Appointments  No future appointments.  Additional Instructions for the Follow-ups that You Need to Schedule     Call MD With Problems / Concerns   As directed      Discharge Follow-up with Specified Provider: Appointment in 2 weeks with ; 2 Weeks   As directed      To: Appointment in 2 weeks with     Follow Up: 2 Weeks               SHAUN Rosario  06/07/21  09:40 EDT  Csd

## 2021-06-07 NOTE — LACTATION NOTE
Mom's nipples tender and red.  Using gels for comfort.  Baby latched well in cradle hold on right breast.  Nipple round and everted upon delatching.

## 2021-06-11 ENCOUNTER — POSTPARTUM VISIT (OUTPATIENT)
Dept: OBSTETRICS AND GYNECOLOGY | Facility: CLINIC | Age: 31
End: 2021-06-11

## 2021-06-11 VITALS
DIASTOLIC BLOOD PRESSURE: 80 MMHG | SYSTOLIC BLOOD PRESSURE: 132 MMHG | BODY MASS INDEX: 32.02 KG/M2 | HEIGHT: 62 IN | WEIGHT: 174 LBS

## 2021-06-11 DIAGNOSIS — O34.211 MATERNAL CARE FOR LOW TRANSVERSE SCAR FROM PREVIOUS CESAREAN DELIVERY: ICD-10-CM

## 2021-06-11 DIAGNOSIS — R39.9 UTI SYMPTOMS: Primary | ICD-10-CM

## 2021-06-11 LAB
BILIRUB BLD-MCNC: NEGATIVE MG/DL
GLUCOSE UR STRIP-MCNC: NEGATIVE MG/DL
KETONES UR QL: NEGATIVE
LEUKOCYTE EST, POC: NEGATIVE
NITRITE UR-MCNC: NEGATIVE MG/ML
PH UR: 6 [PH] (ref 5–8)
PROT UR STRIP-MCNC: NEGATIVE MG/DL
RBC # UR STRIP: ABNORMAL /UL
SP GR UR: 1.01 (ref 1–1.03)
UROBILINOGEN UR QL: NORMAL

## 2021-06-11 PROCEDURE — 99213 OFFICE O/P EST LOW 20 MIN: CPT | Performed by: OBSTETRICS & GYNECOLOGY

## 2021-06-11 PROCEDURE — 81002 URINALYSIS NONAUTO W/O SCOPE: CPT | Performed by: OBSTETRICS & GYNECOLOGY

## 2021-06-11 RX ORDER — AMOXICILLIN AND CLAVULANATE POTASSIUM 875; 125 MG/1; MG/1
1 TABLET, FILM COATED ORAL EVERY 12 HOURS
Qty: 14 TABLET | Refills: 0 | Status: SHIPPED | OUTPATIENT
Start: 2021-06-11 | End: 2021-06-18

## 2021-06-11 RX ORDER — HYDROCODONE BITARTRATE AND ACETAMINOPHEN 5; 325 MG/1; MG/1
2 TABLET ORAL EVERY 4 HOURS PRN
Qty: 20 TABLET | Refills: 0 | Status: SHIPPED | OUTPATIENT
Start: 2021-06-11 | End: 2021-07-13

## 2021-06-11 NOTE — PROGRESS NOTES
"Chief Complaint   Patient presents with   • UTI symptoms       Subjective   HPI  Thomas Noriega is a 30 y.o. female, , who presents for UTI symptoms and having a new bruise above incision.    Urinary Tract Infection  Patient complains of burning with urination and incomplete bladder emptying. She has had symptoms for 1 day. Patient also complains of none. Patient denies any other symptoms. Patient does not have a history of recurrent UTI. Patient does not have a history of pyelonephritis.     Health Maintenance   Topic Date Due   • Annual Gynecologic Pelvic and Breast Exam  Never done   • ANNUAL PHYSICAL  Never done   • COVID-19 Vaccine (1) Never done   • TDAP/TD VACCINES (2 - Tdap) 2014   • INFLUENZA VACCINE  2021   • PAP SMEAR  2022   • HEPATITIS C SCREENING  Completed   • Pneumococcal Vaccine 0-64  Aged Out       The additional following portions of the patient's history were reviewed and updated as appropriate: allergies, current medications, past family history, past medical history, past social history, past surgical history and problem list.    Review of Systems   All other systems reviewed and are negative.      I have reviewed and agree with the HPI, ROS, and historical information as entered above. Eliz Ruffin, RN  PHYSICAL EXAM:    /80   Ht 157.5 cm (62\")   Wt 78.9 kg (174 lb)   LMP 2020   Breastfeeding Yes   BMI 31.83 kg/m²   Abdomen: +BS, benign, no masses, soft, non-tender.  Incision: yes echymosis above and below incision and small area of induration in center above, no fluctuance, erythema, or warmth  Extremities: No deep calf tenderness.  Postpartum Depression Screening Questionnaire: neg    IMPRESSION/PLAN:  30 y.o.  s/p , 2 weeks postpartum.  Doing well  - Recovering nicely from her delivery  - urine culture sent; abx prescribed but patient plans not to start unless she is not improving with conservative measures (hydration, cranberry " juice, azo) or if urine cuture abnormal.    -f/u with Dr. Grier on Tuesday as planned in case developing abscess.    Alana North MD  06/11/2021

## 2021-06-13 LAB
BACTERIA UR CULT: NO GROWTH
BACTERIA UR CULT: NORMAL

## 2021-06-15 ENCOUNTER — OFFICE VISIT (OUTPATIENT)
Dept: OBSTETRICS AND GYNECOLOGY | Facility: CLINIC | Age: 31
End: 2021-06-15

## 2021-06-15 VITALS — BODY MASS INDEX: 30.36 KG/M2 | WEIGHT: 166 LBS | SYSTOLIC BLOOD PRESSURE: 132 MMHG | DIASTOLIC BLOOD PRESSURE: 82 MMHG

## 2021-06-15 DIAGNOSIS — Z30.2 REQUEST FOR STERILIZATION: ICD-10-CM

## 2021-06-15 DIAGNOSIS — O34.211 MATERNAL CARE FOR LOW TRANSVERSE SCAR FROM PREVIOUS CESAREAN DELIVERY: ICD-10-CM

## 2021-06-15 DIAGNOSIS — Z98.891 PREVIOUS CESAREAN SECTION: Primary | ICD-10-CM

## 2021-06-15 PROCEDURE — 0503F POSTPARTUM CARE VISIT: CPT | Performed by: OBSTETRICS & GYNECOLOGY

## 2021-06-15 NOTE — PROGRESS NOTES
Chief Complaint   Patient presents with   • Postpartum Care     2 week        2 Week Postpartum Visit         Thomas Noriega is a 30 y.o.  s/p Repeat  with a desire sterilization at 39 0/7 weeks on 2021, who presents today for a 2 week postpartum check.      The incision and is healing well..  She reports that she is having pain just about the incision on the left side.  She describes it as a burning or stinging sensation that has been occurring since delivery that has only slightly improved. She says that it worsens with certain movements such as turning or twisting or engaging her core with thing such as going from a laying to sitting position.  Wearing a compression band seems to be the only thing to help alleviate discomfort, and this is the only thing causing her to have to continue to take tylenol and IBU. If she tries to d/c discomfort is significantly worse.      She also reports that she has a abnormal oblong knot just above the incision, between her belly button and incision.  She denies it being there prior to second .  She reports that the area is tender when palpated, but otherwise it is okay.      She reports that the mild dysuria that she was seen for last week has almost resolved.      Patient denies postpartum depression.  Patient describes bleeding as light.  Patient is breast feeding, and denies any issues with her breast. She had Bilateral segmental salpingectomy at the time of her  for contraception.  Patient denies bowel or bladder issues.    Postpartum Depression Screening Questionnaire: Was completed at her previous appt, no treatment indicated.  Baby Name: MaleGina Polanco   Baby Weight: 7 # 8.6oz   Baby Discharged: Discharged with Mom  Delivering Physician: SUDHIR    The additional following portions of the patient's history were reviewed and updated as appropriate: allergies, current medications, past family history, past medical history, past social  history, past surgical history and problem list.    Review of Systems  All other systems reviewed and are negative.     I have reviewed and agree with the HPI, ROS, and historical information as entered above. Colby Grier MD    /82   Wt 75.3 kg (166 lb)   LMP 2020   Breastfeeding Yes   BMI 30.36 kg/m²     Physical Exam  Abdomen soft, nontender  Incision healing well, appropriate tenderness at midline  Fundus mildly tender      Assessment and Plan    Problem List Items Addressed This Visit        Genitourinary and Reproductive     Request for sterilization    Overview     At time of             Gravid and     Previous  section - Primary    Maternal care for low transverse scar from previous  delivery    Relevant Medications    HYDROcodone-acetaminophen (NORCO) 5-325 MG per tablet    Postpartum follow-up      Doing well 1 to 2 weeks post     1. S/p , 2 weeks postpartum.  Doing well.    2. May drive, can slowly increase activity as tolerated. Continue lifting restrictions maximum 10-15lbs.   3. Follow up in 4 weeks for 6 week post-partum visit.     Colby Grier MD  06/15/2021

## 2021-07-13 ENCOUNTER — OFFICE VISIT (OUTPATIENT)
Dept: OBSTETRICS AND GYNECOLOGY | Facility: CLINIC | Age: 31
End: 2021-07-13

## 2021-07-13 VITALS
SYSTOLIC BLOOD PRESSURE: 122 MMHG | BODY MASS INDEX: 28.78 KG/M2 | WEIGHT: 156.4 LBS | HEIGHT: 62 IN | DIASTOLIC BLOOD PRESSURE: 70 MMHG

## 2021-07-13 DIAGNOSIS — Z01.419 ENCOUNTER FOR GYNECOLOGICAL EXAMINATION: Primary | ICD-10-CM

## 2021-07-13 PROCEDURE — 0503F POSTPARTUM CARE VISIT: CPT | Performed by: NURSE PRACTITIONER

## 2021-07-13 NOTE — PROGRESS NOTES
"Chief Complaint   Patient presents with   • Postpartum Care       6 Week Postpartum Visit         Thomas Noriega is a 30 y.o.  s/p , due to previous  at 39 weeks on 2021, who presents today for a 6 week postpartum check.      At the time of delivery were you diagnosed with any of the following: None. The incision and is healing well.    Patient denies postpartum depression.  Patient describes bleeding as light.  Patient is breast feeding.  Desires contraceptive methods: None for contraception. Pt had tubal ligation. Patient denies bowel or bladder issues.    Postpartum Depression Screening Questionnaire: score 0, no treatment indicated.  Baby Name: Collin Noriega  Baby Weight: 7lb 9oz  Baby Discharged: Discharged with Mom  Delivering Physician: Dr Grier    Last Completed Pap Smear          Ordered - PAP SMEAR (Every 3 Years) Ordered on 2019  Done - Negative              Is the patient due for a pap today? Yes    The additional following portions of the patient's history were reviewed and updated as appropriate: allergies, current medications, past family history, past medical history, past social history, past surgical history and problem list.    Review of Systems   Constitutional: Negative.    Cardiovascular: Negative.    Gastrointestinal: Negative.    Genitourinary: Positive for vaginal bleeding (very light bleeding present).   Psychiatric/Behavioral: Negative.    All other systems reviewed and are negative.    All other systems reviewed and are negative.     I have reviewed and agree with the HPI, ROS, and historical information as entered above. Kanwal Costa, APRN    /70 (BP Location: Left arm, Patient Position: Sitting, Cuff Size: Adult)   Ht 157.5 cm (62\")   Wt 70.9 kg (156 lb 6.4 oz)   LMP 2020   Breastfeeding Yes   BMI 28.61 kg/m²     Physical Exam  Vitals and nursing note reviewed. Exam conducted with a chaperone present. "   Constitutional:       Appearance: Normal appearance. She is normal weight.   Cardiovascular:      Rate and Rhythm: Normal rate and regular rhythm.   Abdominal:      Palpations: Abdomen is soft.      Comments: C/S incision CDI without redness   Genitourinary:     General: Normal vulva.      Vagina: Normal.      Cervix: Normal.      Uterus: Normal.       Adnexa: Right adnexa normal and left adnexa normal.      Rectum: Normal.   Neurological:      Mental Status: She is alert.             Assessment and Plan    Problem List Items Addressed This Visit     None      Visit Diagnoses     Encounter for gynecological examination    -  Primary    Relevant Orders    Pap IG, HPV-hr      Post partum follow up 6 weeks post  section    1. S/p , 6 weeks postpartum.  Doing well.    2. Return to normal physical activity.  No pelvic restrictions.   3. Contraception: contraceptive methods: Tubal ligation  4. Pap smear today.  Follow up in 1 year.     Kanwal Costa, SHAUN  2021

## 2021-07-14 ENCOUNTER — TELEPHONE (OUTPATIENT)
Dept: OBSTETRICS AND GYNECOLOGY | Facility: CLINIC | Age: 31
End: 2021-07-14

## 2021-07-15 RX ORDER — FLUCONAZOLE 150 MG/1
TABLET ORAL
Qty: 2 TABLET | Refills: 0 | Status: SHIPPED | OUTPATIENT
Start: 2021-07-15 | End: 2021-08-16 | Stop reason: SDUPTHER

## 2021-07-20 DIAGNOSIS — Z01.419 ENCOUNTER FOR GYNECOLOGICAL EXAMINATION: ICD-10-CM

## 2021-08-06 RX ORDER — FLUCONAZOLE 150 MG/1
150 TABLET ORAL AS NEEDED
Qty: 2 TABLET | Refills: 0 | Status: SHIPPED | OUTPATIENT
Start: 2021-08-06 | End: 2021-08-16 | Stop reason: SDUPTHER

## 2021-08-06 NOTE — TELEPHONE ENCOUNTER
Patient is calling because she was prescribed Diflucan for both a yeast infection and thrush. She is still experiencing some issues with both not going away and was wondering if she needs another round of the medication or if she needs to come in.

## 2021-08-16 ENCOUNTER — TELEPHONE (OUTPATIENT)
Dept: OBSTETRICS AND GYNECOLOGY | Facility: CLINIC | Age: 31
End: 2021-08-16

## 2021-08-16 DIAGNOSIS — B37.31 CANDIDIASIS, VAGINA: ICD-10-CM

## 2021-08-16 DIAGNOSIS — B37.0 THRUSH: Primary | ICD-10-CM

## 2021-08-16 RX ORDER — FLUCONAZOLE 200 MG/1
TABLET ORAL
Qty: 15 TABLET | Refills: 0 | Status: SHIPPED | OUTPATIENT
Start: 2021-08-16 | End: 2021-09-08

## 2021-08-16 NOTE — TELEPHONE ENCOUNTER
Pt lVM stating she is still dealing with Thrush and wants to know if there is anything else she can take to help.

## 2021-09-08 RX ORDER — FLUCONAZOLE 200 MG/1
TABLET ORAL
Qty: 15 TABLET | Refills: 0 | Status: SHIPPED | OUTPATIENT
Start: 2021-09-08 | End: 2022-07-20

## 2021-09-22 DIAGNOSIS — B37.31 CANDIDIASIS, VAGINA: ICD-10-CM

## 2021-09-22 DIAGNOSIS — B37.0 THRUSH: ICD-10-CM

## 2021-09-22 RX ORDER — FLUCONAZOLE 150 MG/1
150 TABLET ORAL AS NEEDED
Qty: 2 TABLET | Refills: 0 | Status: SHIPPED | OUTPATIENT
Start: 2021-09-22 | End: 2022-07-20

## 2021-09-22 RX ORDER — NYSTATIN 100000 U/G
1 CREAM TOPICAL 2 TIMES DAILY
Qty: 30 G | Refills: 0 | Status: SHIPPED | OUTPATIENT
Start: 2021-09-22 | End: 2022-07-20

## 2021-09-22 NOTE — TELEPHONE ENCOUNTER
Patient reports she has had thrush for 3 months now. 1 round of fluconozole and 2 rounds for appropriate dosing of thrush. Still has thrush present on 1 nipple (right) and possible vaginal yeast infection. Changes nursing pads out 1-2x daily, pacifier 1-2x daily, and sterilizing.    Spoke with SHAUN Baumann. Nystatin cream for R nipple BID and diflucan for vaginal yeast. Continue to change out pads, pacifiers, and keep sterilizing. Verbalized understanding.    [facility] pharmacy.

## 2021-09-27 RX ORDER — CEPHALEXIN 500 MG/1
500 CAPSULE ORAL 4 TIMES DAILY
Qty: 28 CAPSULE | Refills: 0 | Status: SHIPPED | OUTPATIENT
Start: 2021-09-27 | End: 2021-10-04

## 2021-09-27 NOTE — TELEPHONE ENCOUNTER
Breast feeding Almost 4 month old. Right breast, temp of 100 plus. She is nursing and pumping and has switched nursing positions to help. Dr. Grier sad to try Keflex. Call and come in if not better in 8 hours.

## 2021-09-27 NOTE — TELEPHONE ENCOUNTER
PT BELIEVES SHE HAS MASTITIS AND IS FEELING WARM, TENDER RED SPOTS ON BREAST, STARTED FEELING BADLY THIS MORNING

## 2022-07-15 ENCOUNTER — TELEPHONE (OUTPATIENT)
Dept: OBSTETRICS AND GYNECOLOGY | Facility: CLINIC | Age: 32
End: 2022-07-15

## 2022-07-15 NOTE — TELEPHONE ENCOUNTER
Pt. Denies redness in the breast, however, she did pump this morning until she was empty. Still having flu like symptoms and a small firmer area in her breast. Pt. States she feels very similar to how she has the past 3 times this has happened. Per TH can do dicloxacillin qid X 7 days. Encouraged patient to only pump for relief or preferably hand express to try to avoid let down. Recommended cold compresses and warm only on the firmer area. Avoid heat to the rest of the breast to avoid dilating the vessels. Also recommended tight sports bras. She VU Will call back with any issues or concerns.

## 2022-07-15 NOTE — TELEPHONE ENCOUNTER
PT STATED SHE HAS HAD MASTITIS SYMPTOMS SINCE Tuesday, STATED SHE IS TRYING TO WEEN, SYMPTOMS INCLUDE LOW GRADE FEVER, CHILLS, BODY ACHES, TENDER BREASTS   ASKED FOR CLINICAL ADVICE

## 2022-07-20 ENCOUNTER — OFFICE VISIT (OUTPATIENT)
Dept: OBSTETRICS AND GYNECOLOGY | Facility: CLINIC | Age: 32
End: 2022-07-20

## 2022-07-20 VITALS
DIASTOLIC BLOOD PRESSURE: 80 MMHG | SYSTOLIC BLOOD PRESSURE: 120 MMHG | HEIGHT: 62 IN | WEIGHT: 156 LBS | BODY MASS INDEX: 28.71 KG/M2

## 2022-07-20 DIAGNOSIS — Z01.419 WOMEN'S ANNUAL ROUTINE GYNECOLOGICAL EXAMINATION: ICD-10-CM

## 2022-07-20 DIAGNOSIS — Z01.419 PAP TEST, AS PART OF ROUTINE GYNECOLOGICAL EXAMINATION: Primary | ICD-10-CM

## 2022-07-20 DIAGNOSIS — N91.2 AMENORRHEA: ICD-10-CM

## 2022-07-20 PROBLEM — O34.219 PREVIOUS CESAREAN DELIVERY AFFECTING PREGNANCY: Status: RESOLVED | Noted: 2021-06-04 | Resolved: 2022-07-20

## 2022-07-20 PROBLEM — R39.9 UTI SYMPTOMS: Status: RESOLVED | Noted: 2021-06-11 | Resolved: 2022-07-20

## 2022-07-20 PROBLEM — Z3A.38 38 WEEKS GESTATION OF PREGNANCY: Status: RESOLVED | Noted: 2021-06-01 | Resolved: 2022-07-20

## 2022-07-20 PROBLEM — Z3A.36 36 WEEKS GESTATION OF PREGNANCY: Status: RESOLVED | Noted: 2021-05-18 | Resolved: 2022-07-20

## 2022-07-20 PROCEDURE — 99395 PREV VISIT EST AGE 18-39: CPT | Performed by: NURSE PRACTITIONER

## 2022-07-20 NOTE — PROGRESS NOTES
Gynecologic Annual Exam Note        Gynecologic Exam        Subjective     HPI  Thomas Noriega is a 31 y.o.  female who presents for annual well woman exam as a established patient. There were no changes to her medical or surgical history since her last visit. Partner Status: Marital Status: .  She is sexually active. She has not had new partners.. STD testing recommendations have been explained to the patient and she does not desire STD testing.    She is weaning her one year old child.  She had mastitis recently.  She last pumped last night and that was the last time.  She has a hx of secondary amenorrhea due to a pituitary adenoma.  She was being managed by Dr Molina.    Additional OB/GYN History   Current contraception: contraceptive methods: None Tubal ligation  Desires to: do not start contraception  Thromboembolic Disease: none  Age of menarche: 14    History of STD: no    Last Pap : 2022. Results: negative. HPV: negative  Last Completed Pap Smear          Ordered - PAP SMEAR (Every 3 Years) Ordered on 2021  Pap IG, HPV-hr    2019  Done - Negative                 History of abnormal Pap smear: no  Gardasil status:completed  Family history of uterine, colon, breast, or ovarian cancer: yes - [place] breast  Performs monthly Self-Breast Exam: no  Exercises Regularly:yes  Feelings of Anxiety or Depression: no  Tobacco Usage?: No       Current Outpatient Medications:   •  dicloxacillin (DYNAPEN) 500 MG capsule, Take 1 capsule by mouth 4 (Four) Times a Day for 7 days., Disp: 28 capsule, Rfl: 0     Patient denies the need for medication refills today.    OB History        2    Para   2    Term   2            AB        Living   2       SAB        IAB        Ectopic        Molar        Multiple   0    Live Births   2                Health Maintenance   Topic Date Due   • ANNUAL PHYSICAL  Never done   • TDAP/TD VACCINES (2 - Tdap) 2014   • COVID-19  "Vaccine (3 - Booster for Pfizer series) 02/15/2022   • INFLUENZA VACCINE  10/01/2022   • Annual Gynecologic Pelvic and Breast Exam  2023   • PAP SMEAR  2024   • HEPATITIS C SCREENING  Completed   • Pneumococcal Vaccine 0-64  Aged Out       Past Medical History:   Diagnosis Date   • First pregnancy    • Papanicolaou smear 2018    normal   • Pituitary microadenoma (HCC) 2017    benign   • Pituitary microadenoma (HCC)         Past Surgical History:   Procedure Laterality Date   •  SECTION N/A 3/6/2018    Procedure:  SECTION PRIMARY;  Surgeon: Colby Grier MD;  Location:  [facility];  Service:    •  SECTION N/A 2021    Procedure:  SECTION REPEAT;  Surgeon: Colby Grier MD;  Location:  [facility];  Service: Obstetrics/Gynecology;  Laterality: N/A;   • WISDOM TOOTH EXTRACTION  2019       The additional following portions of the patient's history were reviewed and updated as appropriate: allergies, current medications, past family history, past medical history, past social history, past surgical history and problem list.    Review of Systems      I have reviewed and agree with the HPI, ROS, and historical information as entered above. Carisa Mcelroy, APRN        Objective   /80   Ht 157.5 cm (62\")   Wt 70.8 kg (156 lb)   BMI 28.53 kg/m²     Physical Exam  Vitals and nursing note reviewed. Exam conducted with a chaperone present.   Constitutional:       General: She is not in acute distress.     Appearance: Normal appearance. She is well-developed. She is not ill-appearing.   HENT:      Head: Normocephalic and atraumatic.   Neck:      Thyroid: No thyroid mass or thyromegaly.   Pulmonary:      Effort: Pulmonary effort is normal. No retractions.   Chest:      Chest wall: No mass.   Breasts:      Right: Normal. No mass, nipple discharge, skin change or tenderness.      Left: Normal. No mass, nipple discharge, skin change or " tenderness.       Abdominal:      Palpations: Abdomen is soft. Abdomen is not rigid. There is no mass.      Tenderness: There is no abdominal tenderness. There is no guarding.      Hernia: No hernia is present. There is no hernia in the left inguinal area.   Genitourinary:     General: Normal vulva.      Labia:         Right: No rash, tenderness or lesion.         Left: No rash, tenderness or lesion.       Vagina: Normal. No vaginal discharge or lesions.      Cervix: Normal.      Uterus: Normal. Not enlarged, not fixed and not tender.       Adnexa: Right adnexa normal and left adnexa normal.        Right: No mass or tenderness.          Left: No mass or tenderness.        Rectum: No external hemorrhoid.   Musculoskeletal:      Cervical back: Normal range of motion. No muscular tenderness.   Skin:     General: Skin is warm and dry.   Neurological:      Mental Status: She is alert and oriented to person, place, and time.   Psychiatric:         Mood and Affect: Mood normal.         Behavior: Behavior normal.            Assessment and Plan    Problem List Items Addressed This Visit    None     Visit Diagnoses     Pap test, as part of routine gynecological examination    -  Primary    Relevant Orders    LIQUID-BASED PAP SMEAR, [facility] ([facility])    Women's annual routine gynecological examination        Amenorrhea              1. GYN annual well woman exam.   2. Reviewed pap guidelines.   3. Reviewed monthly self breast exams.  Instructed to call with lumps, pain, or breast discharge.    4. Reviewed exercise as a preventative health measures.   5. Reccommended Flu Vaccine in Fall of each year.  6. RTC in 1 year or PRN with problems  Return in about 1 year (around 7/20/2023) for Annual physical.   8.   D/w pt make an appt with Dr Molina within the next 3 months.       Carisa Mcelroy, APRN  07/20/2022

## 2022-07-25 LAB — REF LAB TEST METHOD: NORMAL

## 2023-07-26 ENCOUNTER — OFFICE VISIT (OUTPATIENT)
Dept: OBSTETRICS AND GYNECOLOGY | Facility: CLINIC | Age: 33
End: 2023-07-26
Payer: COMMERCIAL

## 2023-07-26 VITALS
DIASTOLIC BLOOD PRESSURE: 78 MMHG | HEIGHT: 62 IN | WEIGHT: 159 LBS | BODY MASS INDEX: 29.26 KG/M2 | SYSTOLIC BLOOD PRESSURE: 120 MMHG | RESPIRATION RATE: 18 BRPM

## 2023-07-26 DIAGNOSIS — Z01.419 WOMEN'S ANNUAL ROUTINE GYNECOLOGICAL EXAMINATION: ICD-10-CM

## 2023-07-26 DIAGNOSIS — R79.89 ELEVATED TSH: ICD-10-CM

## 2023-07-26 DIAGNOSIS — E22.1 HYPERPROLACTINEMIA: Primary | ICD-10-CM

## 2023-07-26 DIAGNOSIS — L65.9 HAIR LOSS: ICD-10-CM

## 2023-07-26 RX ORDER — MULTIPLE VITAMINS W/ MINERALS TAB 9MG-400MCG
1 TAB ORAL DAILY
COMMUNITY

## 2023-07-26 RX ORDER — CABERGOLINE 0.5 MG/1
0.5 TABLET ORAL
COMMUNITY
Start: 2022-08-22 | End: 2023-07-26 | Stop reason: SDUPTHER

## 2023-07-26 RX ORDER — CABERGOLINE 0.5 MG/1
TABLET ORAL
Qty: 5 TABLET | Refills: 0 | Status: SHIPPED | OUTPATIENT
Start: 2023-07-26

## 2023-07-26 NOTE — PROGRESS NOTES
Gynecologic Annual Exam Note        CC:  annual      Subjective     HPI  Thomas Noriega is a 32 y.o.  female who presents for annual well woman exam as a established patient. There were no changes to her medical or surgical history since her last visit.. Patient reports problems with:  pituitary adenoma and recent lab results from dermatologist. . Patient's last menstrual period was 2023 (within days).. Her periods occur every 25-35 days , lasting >7 days. The flow is heavy saturating a pad/tampon every 2 hours. This heavy bleeding lasts for 4 days of her period... She reports dysmenorrhea is none. Partner Status: Marital Status: .  She is sexually active. She has not had new partners.. STD testing recommendations have been explained to the patient and she does not desire STD testing.    She has a hx of amenorrhea, elevated prolactin, MRI-diagnosed pituitary adenoma, and carbergoline use.  She has been seeing Dr Molina.  Labs were drawn there; she is unsure of the results but was told they were not concerning.  She saw dermatology for a second opinion recently due to increased hair loss.  Labs were abnormal--- TSH high, DHEA high, SBGH low, free and total testosterone high.  Normal were- B12, folate, CBC, iron studies, free T4, CMP.  Prolactin was not checked.  She is concerned with continued hair loss and wants to see LAMINE mayers.  She has continued to take carbergoline in order to have monthly periods.  She also has an appt with a new PCP next month.    Additional OB/GYN History   Current contraception: contraceptive methods: Tubal ligation  Desires to: do not start contraception  Thromboembolic Disease: none  Age of menarche: 14    History of STD: no    Last Pap : 2022. Results: negative. HPV: not done.   Last Completed Pap Smear            PAP SMEAR (Every 3 Years) Next due on 2022  LIQUID-BASED PAP SMEAR, [facility] ([facility])    2021  Pap IG, HPV-hr     2019  Done - Negative                     History of abnormal Pap smear: no  Gardasil status:completed  Family history of uterine, colon, breast, or ovarian cancer: yes - [place] has breast cancer  Performs monthly Self-Breast Exam: no  Exercises Regularly:yes  Feelings of Anxiety or Depression: no  Tobacco Usage?: No       Current Outpatient Medications:     cabergoline (DOSTINEX) 0.5 MG tablet, Take 1/2 every 3.5 days, Disp: 5 tablet, Rfl: 0    multivitamin with minerals (MULTIVITAMIN ADULT PO), Take 1 tablet by mouth Daily., Disp: , Rfl:          OB History          2    Para   2    Term   2            AB        Living   2         SAB        IAB        Ectopic        Molar        Multiple   0    Live Births   2                Health Maintenance   Topic Date Due    TDAP/TD VACCINES (2 - Tdap) 2014    ANNUAL PHYSICAL  Never done    COVID-19 Vaccine (3 - Pfizer series) 11/10/2021    Annual Gynecologic Pelvic and Breast Exam  2023    INFLUENZA VACCINE  10/01/2023    PAP SMEAR  2025    HEPATITIS C SCREENING  Completed    Pneumococcal Vaccine 0-64  Aged Out       Past Medical History:   Diagnosis Date    First pregnancy     Papanicolaou smear 2018    normal    Pituitary microadenoma 2017    benign    Pituitary microadenoma         Past Surgical History:   Procedure Laterality Date     SECTION N/A 3/6/2018    Procedure:  SECTION PRIMARY;  Surgeon: Colby Grier MD;  Location:  [facility];  Service:      SECTION N/A 2021    Procedure:  SECTION REPEAT;  Surgeon: Colby Grier MD;  Location:  [facility];  Service: Obstetrics/Gynecology;  Laterality: N/A;    WISDOM TOOTH EXTRACTION  2019       The additional following portions of the patient's history were reviewed and updated as appropriate: allergies, current medications, past family history, past medical history, past social history, past surgical history,  "and problem list.    Review of Systems      I have reviewed and agree with the HPI, ROS, and historical information as entered above. Carisa Mcelroy, APRN          Objective   /78   Resp 18   Ht 157.5 cm (62.01\")   Wt 72.1 kg (159 lb)   LMP 06/30/2023 (Within Days)   Breastfeeding No   BMI 29.07 kg/m²     Physical Exam  Vitals and nursing note reviewed. Exam conducted with a chaperone present.   Constitutional:       General: She is not in acute distress.     Appearance: Normal appearance. She is well-developed. She is not ill-appearing.   Neck:      Thyroid: No thyroid mass or thyromegaly.   Pulmonary:      Effort: Pulmonary effort is normal. No respiratory distress or retractions.   Chest:      Chest wall: No mass.   Breasts:     Right: Normal. No mass, nipple discharge, skin change or tenderness.      Left: Normal. No mass, nipple discharge, skin change or tenderness.   Abdominal:      General: There is no distension.      Palpations: Abdomen is soft. Abdomen is not rigid. There is no mass.      Tenderness: There is no abdominal tenderness. There is no guarding or rebound.      Hernia: No hernia is present. There is no hernia in the left inguinal area.   Genitourinary:     General: Normal vulva.      Labia:         Right: No rash, tenderness or lesion.         Left: No rash, tenderness or lesion.       Vagina: Normal. No vaginal discharge or lesions.      Cervix: Normal.      Uterus: Normal. Not enlarged, not fixed and not tender.       Adnexa: Right adnexa normal and left adnexa normal.        Right: No mass or tenderness.          Left: No mass or tenderness.        Rectum: No external hemorrhoid.   Musculoskeletal:      Cervical back: No muscular tenderness.   Skin:     General: Skin is warm and dry.   Neurological:      Mental Status: She is alert and oriented to person, place, and time.   Psychiatric:         Mood and Affect: Mood normal.         Behavior: Behavior normal.          Assessment and " Plan    Problem List Items Addressed This Visit    None  Visit Diagnoses       Hyperprolactinemia    -  Primary    Relevant Medications    cabergoline (DOSTINEX) 0.5 MG tablet    Other Relevant Orders    Prolactin    Ambulatory Referral to Endocrinology    Women's annual routine gynecological examination        Hair loss        Elevated TSH                GYN annual well woman exam.   Reviewed pap guidelines.   Reviewed monthly self breast exams.  Instructed to call with lumps, pain, or breast discharge.    Reviewed exercise as a preventative health measures.   Reccommended Flu Vaccine in Fall of each year.  RTC in 1 year or PRN with problems  Return in about 1 year (around 7/26/2024), or if symptoms worsen or fail to improve, for Annual physical.  Checked prolactin today.  Referred to LAMINE mayers.  Refilled carbergoline for now.        Carisa Mcelroy, APRN  07/26/2023

## 2023-07-27 LAB — PROLACTIN SERPL-MCNC: 23.3 NG/ML (ref 4.8–23.3)

## 2023-09-03 DIAGNOSIS — E22.1 HYPERPROLACTINEMIA: ICD-10-CM

## 2023-09-05 ENCOUNTER — TELEPHONE (OUTPATIENT)
Dept: OBSTETRICS AND GYNECOLOGY | Facility: CLINIC | Age: 33
End: 2023-09-05
Payer: COMMERCIAL

## 2023-09-05 RX ORDER — CABERGOLINE 0.5 MG/1
TABLET ORAL
Qty: 5 TABLET | Refills: 3 | Status: SHIPPED | OUTPATIENT
Start: 2023-09-05

## 2023-09-05 NOTE — TELEPHONE ENCOUNTER
Sent patient in refills of Cabergoline. Then saw the message per Carisa Mcelroy for more refills of this medication patient needs to see Endocrinology. I have called and spoken to the pharmacy she needs one fill for today and no more refills from us. Alireza at [facility] has taken care of this.

## 2023-10-04 ENCOUNTER — OFFICE VISIT (OUTPATIENT)
Dept: ENDOCRINOLOGY | Facility: CLINIC | Age: 33
End: 2023-10-04
Payer: COMMERCIAL

## 2023-10-04 VITALS
OXYGEN SATURATION: 98 % | SYSTOLIC BLOOD PRESSURE: 122 MMHG | HEIGHT: 62 IN | DIASTOLIC BLOOD PRESSURE: 76 MMHG | HEART RATE: 70 BPM | BODY MASS INDEX: 29.26 KG/M2 | WEIGHT: 159 LBS

## 2023-10-04 DIAGNOSIS — E22.1 HYPERPROLACTINEMIA: ICD-10-CM

## 2023-10-04 DIAGNOSIS — D35.2 PITUITARY MICROADENOMA: Primary | ICD-10-CM

## 2023-10-04 DIAGNOSIS — E03.8 SUBCLINICAL HYPOTHYROIDISM: ICD-10-CM

## 2023-10-04 LAB
ANION GAP SERPL CALCULATED.3IONS-SCNC: 11 MMOL/L (ref 5–15)
BUN SERPL-MCNC: 14 MG/DL (ref 6–20)
BUN/CREAT SERPL: 18.9 (ref 7–25)
CALCIUM SPEC-SCNC: 9.2 MG/DL (ref 8.6–10.5)
CHLORIDE SERPL-SCNC: 104 MMOL/L (ref 98–107)
CO2 SERPL-SCNC: 24 MMOL/L (ref 22–29)
CORTIS SERPL-MCNC: 5.27 MCG/DL
CREAT SERPL-MCNC: 0.74 MG/DL (ref 0.57–1)
EGFRCR SERPLBLD CKD-EPI 2021: 111.8 ML/MIN/1.73
ESTRADIOL SERPL HS-MCNC: 28.7 PG/ML
FSH SERPL-ACNC: 5.8 MIU/ML
GLUCOSE SERPL-MCNC: 91 MG/DL (ref 65–99)
LH SERPL-ACNC: 4.72 MIU/ML
POTASSIUM SERPL-SCNC: 4.3 MMOL/L (ref 3.5–5.2)
PROLACTIN SERPL-MCNC: 23.2 NG/ML (ref 4.79–23.3)
SODIUM SERPL-SCNC: 139 MMOL/L (ref 136–145)
TSH SERPL DL<=0.05 MIU/L-ACNC: 1.34 UIU/ML (ref 0.27–4.2)

## 2023-10-04 PROCEDURE — 83002 ASSAY OF GONADOTROPIN (LH): CPT | Performed by: INTERNAL MEDICINE

## 2023-10-04 PROCEDURE — 84146 ASSAY OF PROLACTIN: CPT | Performed by: INTERNAL MEDICINE

## 2023-10-04 PROCEDURE — 84305 ASSAY OF SOMATOMEDIN: CPT | Performed by: INTERNAL MEDICINE

## 2023-10-04 PROCEDURE — 83001 ASSAY OF GONADOTROPIN (FSH): CPT | Performed by: INTERNAL MEDICINE

## 2023-10-04 PROCEDURE — 82533 TOTAL CORTISOL: CPT | Performed by: INTERNAL MEDICINE

## 2023-10-04 PROCEDURE — 84443 ASSAY THYROID STIM HORMONE: CPT | Performed by: INTERNAL MEDICINE

## 2023-10-04 PROCEDURE — 82670 ASSAY OF TOTAL ESTRADIOL: CPT | Performed by: INTERNAL MEDICINE

## 2023-10-04 PROCEDURE — 80048 BASIC METABOLIC PNL TOTAL CA: CPT | Performed by: INTERNAL MEDICINE

## 2023-10-04 RX ORDER — THYROID 15 MG/1
15 TABLET ORAL DAILY
COMMUNITY

## 2023-10-04 NOTE — PROGRESS NOTES
Chief Complaint   Patient presents with    Pituitary Adenoma        New patient who is being seen in consultation regarding pituitary adenoma at the request of Carisa Mcelroy APRN HPI   Thomas Noriega is a 32 y.o. female who presents for evaluation of pituitary adenoma.    Patient reports that several years ago she stopped OCP due to desire for pregnancy. She did not have periods and then had labs with OB which showed high prolactin. MRI showed 4mm  adenoma. She was seen by endocrinology at [facility] and started on cabergoline and then became pregnant. She transitioned Endocrinologists after delivery but states she did not follow up long term. She took cabergoline again when attempting to get pregnant with her second child. She estimates that she took this for 6 months and then discontinued.  Cabergoline was restarted by Dr. Molina 1 year ago and patient is currently taking 0.25 mg twice weekly.  She reports that she believes other hormones have been historically normal.  She does not believe she ever had repeat imaging.  She did start to have hair loss and saw dermatology who noted some abnormal thyroid labs.  Subsequent labs with PCP prompted initiation of NP thyroid a few weeks ago.  Patient reports she has not had any change in symptomatic concerns.      Past Medical History:   Diagnosis Date    First pregnancy     Papanicolaou smear 2018    normal    Pituitary adenoma     Pituitary microadenoma 2017    benign    Pituitary microadenoma      Past Surgical History:   Procedure Laterality Date     SECTION N/A 2018    Procedure:  SECTION PRIMARY;  Surgeon: Colby Grier MD;  Location:  [facility];  Service:      SECTION N/A 2021    Procedure:  SECTION REPEAT;  Surgeon: Colby Grier MD;  Location:  [facility];  Service: Obstetrics/Gynecology;  Laterality: N/A;    TUBAL ABDOMINAL LIGATION      WISDOM TOOTH EXTRACTION  2019     "  Family History   Problem Relation Age of Onset    Hypertension Mother     Other Sister         19  in car accident    Cancer Paternal Aunt     Breast cancer Paternal Aunt     Thyroid disease Maternal Grandfather     Cancer Paternal Grandmother     Brain cancer Paternal Grandmother     Lung cancer Paternal Grandfather     Hyperlipidemia Paternal Grandfather     Hypertension Other       Social History     Socioeconomic History    Marital status:      Spouse name: Barry Noriega     Number of children: 1   Tobacco Use    Smoking status: Never    Smokeless tobacco: Never   Vaping Use    Vaping Use: Never used   Substance and Sexual Activity    Alcohol use: Not Currently     Comment: light per merlin    Drug use: Never    Sexual activity: Yes     Partners: Male     Birth control/protection: None, Surgical     Comment: Tubal igation      No Known Allergies   Current Outpatient Medications on File Prior to Visit   Medication Sig Dispense Refill    cabergoline (DOSTINEX) 0.5 MG tablet TAKE  1/2 (ONE-HALF) TABLET BY MOUTH EVERY 3.5 (THREE AND A HALF) DAYS 5 tablet 3    multivitamin with minerals tablet tablet Take 1 tablet by mouth Daily.      thyroid (ARMOUR) 15 MG tablet Take 1 tablet by mouth Daily.       No current facility-administered medications on file prior to visit.        Review of Systems   Genitourinary:  Negative for amenorrhea and breast discharge.   Allergic/Immunologic: Positive for food allergies.      Vitals:    10/04/23 0821   BP: 122/76   BP Location: Left arm   Patient Position: Sitting   Cuff Size: Adult   Pulse: 70   SpO2: 98%   Weight: 72.1 kg (159 lb)   Height: 157.5 cm (62\")   Body mass index is 29.08 kg/m².     Physical Exam  Vitals reviewed.   Constitutional:       General: She is not in acute distress.  HENT:      Head: Normocephalic and atraumatic.   Neck:      Thyroid: No thyromegaly.   Cardiovascular:      Rate and Rhythm: Normal rate and regular rhythm.   Pulmonary:      Effort: " Pulmonary effort is normal.      Breath sounds: Normal breath sounds.   Abdominal:      General: Bowel sounds are normal.      Palpations: Abdomen is soft.      Tenderness: There is no abdominal tenderness.   Skin:     General: Skin is warm and dry.   Neurological:      General: No focal deficit present.      Mental Status: She is alert.   Psychiatric:         Mood and Affect: Mood and affect normal.         Behavior: Behavior is cooperative.        Labs/Imaging  Labs dated 9/19/2023  TPO antibodies 32  Thyroglobulin antibody 37.9  Prolactin 17.1  TSH 2.25  Total T46.1  Free thyroxine index 1.7      Labs dated 6/30/2023  Sodium 139    Labs dated 6/30/2023.  TSH 4.99, elevated  Free T40.79    Assessment and Plan    Diagnoses and all orders for this visit:    1. Pituitary microadenoma (Primary)  -     TSH; Future  -     Luteinizing Hormone; Future  -     Follicle Stimulating Hormone; Future  -     Estradiol; Future  -     Cortisol; Future  -     Prolactin; Future  -     Insulin-like Growth Factor; Future  -     Basic Metabolic Panel; Future  Patient reports she was diagnosed with a 4 mm pituitary adenoma following imaging due to hyperprolactinemia several years ago.  Records not available for review at this time, these will be requested.  Discussed typical monitoring of pituitary microadenoma's.  Plan to update labs for evaluation of pituitary hormones.  Discussed with patient that she is due for repeat imaging, will plan to order this once above labs result.    2. Subclinical hypothyroidism  Patient reports that thyroid function testing was obtained secondary to hair loss.  Labs showed normal free T4, slightly elevated TSH.  Patient reports she has not noted any symptomatic improvement since starting NP thyroid 15 mg daily.  We did discuss that hair loss is nonspecific and may be multifactorial.  Discussed with patient that it is too soon to expect physiologic response if a portion of her hair loss was due to thyroid  hormone.  Reviewed that if, in the long-term, symptoms are not improved we could consider discontinuation of this medication pending patient preference.  Reviewed symptoms of thyroid hormone abnormalities, updating labs today.    3. Hyperprolactinemia [E22.1 (ICD-10-CM)]  Patient is currently taking cabergoline 0.25 mg twice weekly.  Potential adverse effects of curling reviewed including nausea, mood changes.  Patient reports no current galactorrhea, menses are regular.  Adjust medication as clinically indicated after review of labs       Return in about 4 months (around 2/4/2024) for Next scheduled follow up. The patient was instructed to contact the clinic with any interval questions or concerns.    Kaelyn Emerson MD     Dictated Utilizing Dragon Dictation

## 2023-10-05 RX ORDER — DEXAMETHASONE 1 MG
1 TABLET ORAL ONCE
Qty: 1 TABLET | Refills: 0 | Status: SHIPPED | OUTPATIENT
Start: 2023-10-05 | End: 2023-10-05

## 2023-10-06 LAB — IGF-I SERPL-MCNC: 271 NG/ML (ref 91–308)

## 2023-10-17 ENCOUNTER — LAB (OUTPATIENT)
Dept: LAB | Facility: HOSPITAL | Age: 33
End: 2023-10-17
Payer: COMMERCIAL

## 2023-10-17 DIAGNOSIS — D35.2 PITUITARY MICROADENOMA: ICD-10-CM

## 2023-10-17 DIAGNOSIS — E22.1 HYPERPROLACTINEMIA: ICD-10-CM

## 2023-10-17 LAB — CORTIS AM PEAK SERPL-MCNC: 0.37 MCG/DL

## 2023-10-17 PROCEDURE — 36415 COLL VENOUS BLD VENIPUNCTURE: CPT

## 2023-10-17 PROCEDURE — 82533 TOTAL CORTISOL: CPT | Performed by: INTERNAL MEDICINE

## 2023-10-17 RX ORDER — CABERGOLINE 0.5 MG/1
0.25 TABLET ORAL 2 TIMES WEEKLY
Qty: 8 TABLET | Refills: 4 | Status: SHIPPED | OUTPATIENT
Start: 2023-10-19

## 2023-10-26 LAB — DEXAMETHASONE SERPL-MCNC: 218 NG/DL

## 2023-11-11 ENCOUNTER — HOSPITAL ENCOUNTER (OUTPATIENT)
Dept: MRI IMAGING | Facility: HOSPITAL | Age: 33
Discharge: HOME | End: 2023-11-11
Admitting: INTERNAL MEDICINE
Payer: COMMERCIAL

## 2023-11-11 DIAGNOSIS — D35.2 PITUITARY MICROADENOMA: ICD-10-CM

## 2023-11-11 PROCEDURE — 0 GADOBENATE DIMEGLUMINE 529 MG/ML SOLUTION: Performed by: INTERNAL MEDICINE

## 2023-11-11 PROCEDURE — 70553 MRI BRAIN STEM W/O & W/DYE: CPT

## 2023-11-11 PROCEDURE — A9577 INJ MULTIHANCE: HCPCS | Performed by: INTERNAL MEDICINE

## 2023-11-11 RX ADMIN — GADOBENATE DIMEGLUMINE 15 ML: 529 INJECTION, SOLUTION INTRAVENOUS at 10:25

## 2024-02-27 ENCOUNTER — OFFICE VISIT (OUTPATIENT)
Dept: ENDOCRINOLOGY | Facility: CLINIC | Age: 34
End: 2024-02-27
Payer: COMMERCIAL

## 2024-02-27 VITALS
HEART RATE: 74 BPM | DIASTOLIC BLOOD PRESSURE: 72 MMHG | HEIGHT: 62 IN | OXYGEN SATURATION: 98 % | SYSTOLIC BLOOD PRESSURE: 120 MMHG | WEIGHT: 160 LBS | BODY MASS INDEX: 29.44 KG/M2

## 2024-02-27 DIAGNOSIS — D35.2 PITUITARY MICROADENOMA: ICD-10-CM

## 2024-02-27 DIAGNOSIS — E22.1 HYPERPROLACTINEMIA: Primary | ICD-10-CM

## 2024-02-27 DIAGNOSIS — R94.6 ABNORMAL THYROID FUNCTION TEST: ICD-10-CM

## 2024-02-27 PROCEDURE — 99214 OFFICE O/P EST MOD 30 MIN: CPT | Performed by: INTERNAL MEDICINE

## 2024-02-27 NOTE — PROGRESS NOTES
"Chief Complaint   Patient presents with    Pituitary microadenoma        HPI   Thomas Noriega is a 33 y.o. female had concerns including Pituitary microadenoma.      Patient presents for follow-up of pituitary microadenoma/hyperprolactinemia.  She did have MRI in the interim since last visit.  She denies any new headaches, vision changes.  She does continue on cabergoline 0.25 mg twice weekly.  She is having regular menstrual cycles but does note that these have been heavier recently.  She states concern that cabergoline may be impacting ability to lose weight.  She reports having modified diet in the interim since last visit with no significant change in weight.  She is interested in a trial off cabergoline.  Patient discontinued thyroid hormone following last visit.    The following portions of the patient's history were reviewed and updated as appropriate: allergies, current medications, and past social history.    Review of Systems   Constitutional:  Negative for unexpected weight gain and unexpected weight loss.        /72 (BP Location: Left arm, Patient Position: Sitting, Cuff Size: Adult)   Pulse 74   Ht 157.5 cm (62\")   Wt 72.6 kg (160 lb)   SpO2 98%   BMI 29.26 kg/m²      Physical Exam      Constitutional:  well developed; well nourished  no acute distress  appears stated age   ENT/Thyroid: not examined   Eyes: Conjunctiva: clear   Respiratory:  breathing is unlabored  clear to auscultation bilaterally   Cardiovascular:  regular rate and rhythm   Chest:  Not performed.   Abdomen: Not performed.   : Not performed.   Musculoskeletal: Not performed   Skin: not performed.   Neuro: mental status, speech normal   Psych: mood and affect are within normal limits       Labs/Imaging   Latest Reference Range & Units 07/26/23 10:20 10/04/23 09:36 10/17/23 08:09   Sodium 136 - 145 mmol/L  139    Potassium 3.5 - 5.2 mmol/L  4.3    Chloride 98 - 107 mmol/L  104    CO2 22.0 - 29.0 mmol/L  24.0    Anion Gap " 5.0 - 15.0 mmol/L  11.0    BUN 6 - 20 mg/dL  14    Creatinine 0.57 - 1.00 mg/dL  0.74    BUN/Creatinine Ratio 7.0 - 25.0   18.9    eGFR >60.0 mL/min/1.73  111.8    Glucose 65 - 99 mg/dL  91    Calcium 8.6 - 10.5 mg/dL  9.2    Cortisol mcg/dL  5.27    Cortisol - AM mcg/dL   0.37   Insulin-Like Growth Factor-1 91 - 308 ng/mL  271    LH mIU/mL  4.72    FSH mIU/mL  5.80    Prolactin 4.79 - 23.30 ng/mL 23.3 23.20    Estradiol pg/mL  28.7    TSH Baseline 0.270 - 4.200 uIU/mL  1.340      MRI BRAIN W WO CONTRAST     Date of Exam: 11/11/2023 9:16 AM EST     Indication: pituitary microadenoma, elevated prolactin.     Comparison: None available.     Technique:  Routine multiplanar/multisequence sequence images of the brain were obtained before and after the uneventful administration of 17 mL Multihance.        Findings:  No acute infarct is present on diffusion weighted sequences. Midline structures appear normal and the craniocervical junction is satisfactory. Gray-white differentiation is maintained and there is no evidence of intracranial hemorrhage, mass or mass   effect. The ventricles are normal in size and configuration. The orbits are normal. The paranasal sinuses are grossly clear. There is no abnormal brain parenchymal enhancement. Dedicated dynamic contrast-enhanced evaluation of the pituitary and sella   demonstrates a focal round area of differential enhancement along the right aspect of the sella, also of distinct intensity on the noncontrast sequences, measuring 5 x 5 mm in the coronal plane, consistent with pituitary microadenoma. There is some   minimal upward convex bulge of the pituitary without suprasellar extent. The optic chiasm is normal and the infundibulum is midline.     IMPRESSION:  Impression:  5 mm circumscribed area of differential enhancement along the right aspect of the pituitary compatible with microadenoma as above. There is no evidence of suprasellar extent or cavernous sinus involvement.       Latest Reference Range & Units 10/04/23 09:36 10/17/23 08:09   Sodium 136 - 145 mmol/L 139    Potassium 3.5 - 5.2 mmol/L 4.3    Chloride 98 - 107 mmol/L 104    CO2 22.0 - 29.0 mmol/L 24.0    Anion Gap 5.0 - 15.0 mmol/L 11.0    BUN 6 - 20 mg/dL 14    Creatinine 0.57 - 1.00 mg/dL 0.74    BUN/Creatinine Ratio 7.0 - 25.0  18.9    eGFR >60.0 mL/min/1.73 111.8    Glucose 65 - 99 mg/dL 91    Calcium 8.6 - 10.5 mg/dL 9.2    Cortisol mcg/dL 5.27    Cortisol - AM mcg/dL  0.37   Insulin-Like Growth Factor-1 91 - 308 ng/mL 271    LH mIU/mL 4.72    FSH mIU/mL 5.80    Prolactin 4.79 - 23.30 ng/mL 23.20    Estradiol pg/mL 28.7    TSH Baseline 0.270 - 4.200 uIU/mL 1.340      Diagnoses and all orders for this visit:    1. Hyperprolactinemia (Primary)  Patient has been taking cabergoline 0.25 mg twice weekly.  She reports concerns that this may be impairing her ability to lose weight.  Reviewed with patient that this would be difficult to explain physiologically.  She would like to do a trial off medication.  We did discuss potential consequences including disruption of menstrual cycle, development of galactorrhea or enlargement of adenoma.  Patient voiced understanding would like to stop medication.  Plan to discontinue cabergoline.    2. Pituitary microadenoma  MRI from November 2023 with 5 mm microadenoma.  Previous imaging in 2017 measured this at 4.5 mm.  Evaluation of pituitary hormones was unremarkable and October 2023 apart from AM cortisol which was slightly lower than expected.  This was drawn at ~9:30 AM.  Suspect this may be due to timing, patient does not have clinical symptoms concerning for adrenal insufficiency.  Plan to repeat cortisol with next morning lab draw.  Of note, patient was taking cabergoline at time of lab draw, see above.  Reviewed typical monitoring of pituitary adenomas.  Reviewed signs and symptoms of pituitary apoplexy and when to seek care.    3. Abnormal thyroid function test  Prior labs with  mild elevation of TSH, normal free T4 in June 2023.  These were obtained secondary to hair loss.  Repeat thyroid function testing after last visit was normal.  Patient subsequently self discontinued medication given lack of symptomatic improvement and normal labs.  Plan to repeat thyroid function testing with next labs.       Return in about 6 months (around 8/27/2024) for Next scheduled follow up. The patient was instructed to contact the clinic with any interval questions or concerns.    Electronically signed by: Kaelyn Emerson MD   Endocrinologist    Dictated Utilizing Dragon Dictation

## 2024-07-31 ENCOUNTER — OFFICE VISIT (OUTPATIENT)
Dept: OBSTETRICS AND GYNECOLOGY | Facility: CLINIC | Age: 34
End: 2024-07-31
Payer: COMMERCIAL

## 2024-07-31 VITALS
HEIGHT: 62 IN | BODY MASS INDEX: 29.44 KG/M2 | DIASTOLIC BLOOD PRESSURE: 70 MMHG | SYSTOLIC BLOOD PRESSURE: 116 MMHG | WEIGHT: 160 LBS

## 2024-07-31 DIAGNOSIS — Z01.419 PAP TEST, AS PART OF ROUTINE GYNECOLOGICAL EXAMINATION: ICD-10-CM

## 2024-07-31 DIAGNOSIS — E22.1 HYPERPROLACTINEMIA: ICD-10-CM

## 2024-07-31 DIAGNOSIS — Z01.419 WOMEN'S ANNUAL ROUTINE GYNECOLOGICAL EXAMINATION: Primary | ICD-10-CM

## 2024-07-31 NOTE — PROGRESS NOTES
Gynecologic Annual Exam Note        Annual        Subjective     HPI  Thomas Noriega is a 33 y.o.  female who presents for annual well woman exam as a established patient. There were no changes to her medical or surgical history since her last visit.. Patient's last menstrual period was 2024 (approximate). Her periods had been regular, following stopping cabergoline in March., but has not had a period for July.  They typically last  days.  The flow is heavy. She reports dysmenorrhea is moderate. Marital Status: .  She is sexually active. She has not had new partners.. STD testing recommendations have been explained to the patient and she does not desire STD testing.    Patient reports stopping cabergoline in March.     The patient would like to discuss the following complaints today: none    Additional OB/GYN History   contraceptive methods: Tubal ligation  Desires to: do not start contraception  Thromboembolic Disease: none  History of migraines: no    History of STD: no    Last Pap : 2022. Results: negative. HPV: not done. Her last HPV testing was  and was negative ..   Last Completed Pap Smear            Ordered - PAP SMEAR (Every 3 Years) Ordered on 2022  LIQUID-BASED PAP SMEAR, [facility] ([facility])    2021  Pap IG, HPV-hr    2019  Done - Negative                     History of abnormal Pap smear: no  Gardasil status:completed  Family history of uterine, colon, breast, or ovarian cancer: yes - Paternal Aunt- Breast Cancer  Performs monthly Self-Breast Exam: no  Exercises Regularly:no  Feelings of Anxiety or Depression: no  Tobacco Usage?: No       Current Outpatient Medications:     multivitamin with minerals tablet tablet, Take 1 tablet by mouth Daily., Disp: , Rfl:      Patient denies the need for medication refills today.    OB History          2    Para   2    Term   2            AB        Living   2         SAB        IAB         Ectopic        Molar        Multiple   0    Live Births   2                Health Maintenance   Topic Date Due    BMI FOLLOWUP  Never done    TDAP/TD VACCINES (2 - Tdap) 2014    ANNUAL PHYSICAL  Never done    COVID-19 Vaccine (3 - - season) 2023    Annual Gynecologic Pelvic and Breast Exam  2024    INFLUENZA VACCINE  2024    PAP SMEAR  2025    HEPATITIS C SCREENING  Completed    Pneumococcal Vaccine 0-64  Aged Out       Past Medical History:   Diagnosis Date    First pregnancy     Papanicolaou smear 2018    normal    Pituitary adenoma     Pituitary microadenoma 2017    benign    Pituitary microadenoma         Past Surgical History:   Procedure Laterality Date     SECTION N/A 2018    Procedure:  SECTION PRIMARY;  Surgeon: Colby Grier MD;  Location:  [facility];  Service:      SECTION N/A 2021    Procedure:  SECTION REPEAT;  Surgeon: Colby Grier MD;  Location:  [facility];  Service: Obstetrics/Gynecology;  Laterality: N/A;    TUBAL ABDOMINAL LIGATION      WISDOM TOOTH EXTRACTION  2019       The additional following portions of the patient's history were reviewed and updated as appropriate: allergies, current medications, past family history, past medical history, past social history, past surgical history, and problem list.    Review of Systems   Constitutional: Negative.    HENT: Negative.     Eyes: Negative.    Respiratory: Negative.     Cardiovascular: Negative.    Gastrointestinal: Negative.    Endocrine: Negative.    Genitourinary: Negative.    Musculoskeletal: Negative.    Skin: Negative.    Allergic/Immunologic: Negative.    Neurological: Negative.    Hematological: Negative.    Psychiatric/Behavioral: Negative.           I have reviewed and agree with the HPI, ROS, and historical information as entered above. Carisa Mcelroy, APRN          Objective   /70   Ht 157.5 cm  "(62\")   Wt 72.6 kg (160 lb)   LMP 06/20/2024 (Approximate)   BMI 29.26 kg/m²     Physical Exam  Vitals and nursing note reviewed. Exam conducted with a chaperone present.   Constitutional:       General: She is not in acute distress.     Appearance: Normal appearance. She is well-developed. She is not ill-appearing.   Neck:      Thyroid: No thyroid mass or thyromegaly.   Pulmonary:      Effort: Pulmonary effort is normal. No respiratory distress or retractions.   Chest:      Chest wall: No mass.   Breasts:     Right: Normal. No mass, nipple discharge, skin change or tenderness.      Left: Normal. No mass, nipple discharge, skin change or tenderness.   Abdominal:      General: There is no distension.      Palpations: Abdomen is soft. Abdomen is not rigid. There is no mass.      Tenderness: There is no abdominal tenderness. There is no guarding or rebound.      Hernia: No hernia is present. There is no hernia in the left inguinal area.   Genitourinary:     General: Normal vulva.      Labia:         Right: No rash, tenderness or lesion.         Left: No rash, tenderness or lesion.       Vagina: Normal. No vaginal discharge or lesions.      Cervix: Normal.      Uterus: Normal. Not enlarged, not fixed and not tender.       Adnexa: Right adnexa normal and left adnexa normal.        Right: No mass or tenderness.          Left: No mass or tenderness.        Rectum: No external hemorrhoid.   Musculoskeletal:      Cervical back: No muscular tenderness.   Skin:     General: Skin is warm and dry.   Neurological:      Mental Status: She is alert and oriented to person, place, and time.   Psychiatric:         Mood and Affect: Mood normal.         Behavior: Behavior normal.            Assessment and Plan    Problem List Items Addressed This Visit          Endocrine and Metabolic    Hyperprolactinemia    Relevant Orders    Prolactin     Other Visit Diagnoses       Women's annual routine gynecological examination    -  Primary    " Relevant Orders    LIQUID-BASED PAP SMEAR WITH HPV GENOTYPING REGARDLESS OF INTERPRETATION ([facility])    Pap test, as part of routine gynecological examination                GYN annual well woman exam.   Reviewed pap guidelines.   Reviewed monthly self breast exams.  Instructed to call with lumps, pain, or breast discharge.    Reviewed exercise as a preventative health measures.   Reccommended Flu Vaccine in Fall of each year.  RTC in 1 year or PRN with problems  Return in about 1 year (around 7/31/2025) for Annual physical.  Will notify of prolactin level.  Keep appt with endo as scheduled.    Carisa Mcelroy, APRN  07/31/2024

## 2024-08-01 LAB — PROLACTIN SERPL-MCNC: 46 NG/ML (ref 4.8–33.4)

## 2024-08-08 LAB — REF LAB TEST METHOD: NORMAL

## 2024-08-30 ENCOUNTER — OFFICE VISIT (OUTPATIENT)
Dept: ENDOCRINOLOGY | Facility: CLINIC | Age: 34
End: 2024-08-30
Payer: COMMERCIAL

## 2024-08-30 VITALS
HEART RATE: 77 BPM | HEIGHT: 62 IN | SYSTOLIC BLOOD PRESSURE: 126 MMHG | DIASTOLIC BLOOD PRESSURE: 76 MMHG | BODY MASS INDEX: 29.33 KG/M2 | OXYGEN SATURATION: 98 % | WEIGHT: 159.4 LBS

## 2024-08-30 DIAGNOSIS — M79.10 MYALGIA: ICD-10-CM

## 2024-08-30 DIAGNOSIS — E22.1 HYPERPROLACTINEMIA: ICD-10-CM

## 2024-08-30 DIAGNOSIS — D35.2 PITUITARY MICROADENOMA: Primary | ICD-10-CM

## 2024-08-30 DIAGNOSIS — R94.6 ABNORMAL THYROID FUNCTION TEST: ICD-10-CM

## 2024-08-30 LAB
ALBUMIN SERPL-MCNC: 4.6 G/DL (ref 3.5–5.2)
ALBUMIN/GLOB SERPL: 1.6 G/DL
ALP SERPL-CCNC: 47 U/L (ref 39–117)
ALT SERPL W P-5'-P-CCNC: 12 U/L (ref 1–33)
ANION GAP SERPL CALCULATED.3IONS-SCNC: 11.8 MMOL/L (ref 5–15)
AST SERPL-CCNC: 13 U/L (ref 1–32)
BILIRUB SERPL-MCNC: 0.6 MG/DL (ref 0–1.2)
BUN SERPL-MCNC: 13 MG/DL (ref 6–20)
BUN/CREAT SERPL: 16.9 (ref 7–25)
CALCIUM SPEC-SCNC: 9.7 MG/DL (ref 8.6–10.5)
CHLORIDE SERPL-SCNC: 105 MMOL/L (ref 98–107)
CO2 SERPL-SCNC: 23.2 MMOL/L (ref 22–29)
CORTIS AM PEAK SERPL-MCNC: 5.83 MCG/DL
CREAT SERPL-MCNC: 0.77 MG/DL (ref 0.57–1)
EGFRCR SERPLBLD CKD-EPI 2021: 105.9 ML/MIN/1.73
ESTRADIOL SERPL HS-MCNC: 70.3 PG/ML
FSH SERPL-ACNC: 2.9 MIU/ML
GLOBULIN UR ELPH-MCNC: 2.9 GM/DL
GLUCOSE SERPL-MCNC: 84 MG/DL (ref 65–99)
LH SERPL-ACNC: 4.11 MIU/ML
POTASSIUM SERPL-SCNC: 4 MMOL/L (ref 3.5–5.2)
PROLACTIN SERPL-MCNC: 48 NG/ML (ref 4.79–23.3)
PROT SERPL-MCNC: 7.5 G/DL (ref 6–8.5)
SODIUM SERPL-SCNC: 140 MMOL/L (ref 136–145)
T4 FREE SERPL-MCNC: 1.05 NG/DL (ref 0.92–1.68)
TSH SERPL DL<=0.05 MIU/L-ACNC: 3.6 UIU/ML (ref 0.27–4.2)

## 2024-08-30 PROCEDURE — 84305 ASSAY OF SOMATOMEDIN: CPT | Performed by: INTERNAL MEDICINE

## 2024-08-30 PROCEDURE — 82533 TOTAL CORTISOL: CPT | Performed by: INTERNAL MEDICINE

## 2024-08-30 PROCEDURE — 84443 ASSAY THYROID STIM HORMONE: CPT | Performed by: INTERNAL MEDICINE

## 2024-08-30 PROCEDURE — 82670 ASSAY OF TOTAL ESTRADIOL: CPT | Performed by: INTERNAL MEDICINE

## 2024-08-30 PROCEDURE — 83001 ASSAY OF GONADOTROPIN (FSH): CPT | Performed by: INTERNAL MEDICINE

## 2024-08-30 PROCEDURE — 84439 ASSAY OF FREE THYROXINE: CPT | Performed by: INTERNAL MEDICINE

## 2024-08-30 PROCEDURE — 80053 COMPREHEN METABOLIC PANEL: CPT | Performed by: INTERNAL MEDICINE

## 2024-08-30 PROCEDURE — 82024 ASSAY OF ACTH: CPT | Performed by: INTERNAL MEDICINE

## 2024-08-30 PROCEDURE — 83002 ASSAY OF GONADOTROPIN (LH): CPT | Performed by: INTERNAL MEDICINE

## 2024-08-30 PROCEDURE — 84146 ASSAY OF PROLACTIN: CPT | Performed by: INTERNAL MEDICINE

## 2024-08-30 RX ORDER — DEXAMETHASONE 1 MG
1 TABLET ORAL ONCE
Qty: 1 TABLET | Refills: 0 | Status: SHIPPED | OUTPATIENT
Start: 2024-08-30 | End: 2024-08-30

## 2024-08-30 RX ORDER — CABERGOLINE 0.5 MG/1
0.25 TABLET ORAL 2 TIMES WEEKLY
Qty: 8 TABLET | Refills: 3 | Status: SHIPPED | OUTPATIENT
Start: 2024-09-02 | End: 2025-09-02

## 2024-08-30 NOTE — PROGRESS NOTES
"Chief Complaint   Patient presents with    Hyperprolactinemia    Pituitary Adenoma        HPI   Thomas Noriega is a 33 y.o. female had concerns including Hyperprolactinemia and Pituitary Adenoma.      Patient discontinued cabergoline following last visit.  She does report that she had repeat labs with her OB recently but did show a rise in prolactin.  Patient reports that menses are regular and denies breast discharge.  She denies headaches or vision changes.  Pateint reports no significant weight changes.    Pateint reports that her whole body hurts. She does do her best to stay active but is no getting dedicated exercise regularly. She is scheduled to see her new PCP this October.    The following portions of the patient's history were reviewed and updated as appropriate: allergies, current medications, and past social history.    Review of Systems   Constitutional:  Negative for unexpected weight gain and unexpected weight loss.   Eyes:  Negative for visual disturbance.   Neurological:  Negative for headache.        /76   Pulse 77   Ht 157.5 cm (62.01\")   Wt 72.3 kg (159 lb 6.4 oz)   LMP 06/20/2024 (Approximate)   SpO2 98%   BMI 29.15 kg/m²      Physical Exam      Constitutional:  well developed; well nourished  no acute distress  appears stated age   ENT/Thyroid: not examined   Eyes: Conjunctiva: clear   Respiratory:  breathing is unlabored  clear to auscultation bilaterally   Cardiovascular:  regular rate and rhythm   Chest:  Not performed.   Abdomen: Not performed.   : Not performed.   Musculoskeletal: Not performed   Skin: not performed.   Neuro: mental status, speech normal   Psych: mood and affect are within normal limits       Labs/Imaging     Latest Reference Range & Units 07/26/23 10:20 10/04/23 09:36 10/17/23 08:09 07/31/24 08:43   Sodium 136 - 145 mmol/L  139     Potassium 3.5 - 5.2 mmol/L  4.3     Chloride 98 - 107 mmol/L  104     CO2 22.0 - 29.0 mmol/L  24.0     Anion Gap 5.0 - 15.0 " mmol/L  11.0     BUN 6 - 20 mg/dL  14     Creatinine 0.57 - 1.00 mg/dL  0.74     BUN/Creatinine Ratio 7.0 - 25.0   18.9     eGFR >60.0 mL/min/1.73  111.8     Glucose 65 - 99 mg/dL  91     Calcium 8.6 - 10.5 mg/dL  9.2     Cortisol mcg/dL  5.27     Cortisol - AM mcg/dL   0.37    Insulin-Like Growth Factor-1 91 - 308 ng/mL  271     LH mIU/mL  4.72     FSH mIU/mL  5.80     Prolactin 4.8 - 33.4 ng/mL 23.3 23.20  46.0 (H)   Estradiol pg/mL  28.7     TSH Baseline 0.270 - 4.200 uIU/mL  1.340     (H): Data is abnormally high    Diagnoses and all orders for this visit:    1. Pituitary microadenoma (Primary)  -     Prolactin; Future  -     TSH; Future  -     T4, Free; Future  -     ACTH; Future  -     Cortisol - AM; Future  -     Insulin-like Growth Factor; Future  -     Estradiol; Future  -     Follicle Stimulating Hormone; Future  -     Luteinizing Hormone; Future  -     Comprehensive Metabolic Panel; Future  MRI from November 2023 with 5 mm microadenoma.  Previous imaging in 2017 measured this at 4.5 mm.  Evaluation of pituitary hormones was unremarkable and October 2023 apart from AM cortisol which was slightly lower than expected.  This was drawn at ~9:30 AM.  Suspect this may be due to timing, patient does not have clinical symptoms concerning for adrenal insufficiency.  Of note, patient was taking cabergoline at time of lab draw, see plan for hyperprolactinemia below.  Patient is due for repeat evaluation pituitary hormones which was ordered today.  Discussed recommendation for repeat MRI if prolactin is again elevated.  Reviewed typical monitoring of pituitary adenomas.  Reviewed signs and symptoms of pituitary apoplexy and when to seek care.    2. Hyperprolactinemia  Repeat labs completed with OB show rise in cabergoline after discontinuation of medication.  Patient does not have symptoms of breast discharge or irregular menses.  Plan to repeat with labs today, if values again elevated will plan to resume  cabergoline.    3. Abnormal thyroid function test  Prior labs with mild elevation of TSH, normal free T4 in June 2023.  These were obtained secondary to hair loss.  Patient reported no symptomatic improvement with medication and thus discontinued, most recent thyroid function testing was normal in October 2023.  Repeat with labs today    4. Myalgia  Discussed that this is a nonspecific symptom with a broad differential diagnosis.  Obtaining labs, as above.  Patient is also advised to follow-up with her PCP regarding this.     Return in about 6 months (around 2/28/2025) for Next scheduled follow up. The patient was instructed to contact the clinic with any interval questions or concerns.    Electronically signed by: Kaelyn Emerson MD   Endocrinologist    Dictated Utilizing Dragon Dictation

## 2024-08-31 LAB — ACTH PLAS-MCNC: 11.1 PG/ML (ref 7.2–63.3)

## 2024-09-01 LAB — IGF-I SERPL-MCNC: 284 NG/ML (ref 84–281)

## 2024-09-20 ENCOUNTER — HOSPITAL ENCOUNTER (OUTPATIENT)
Dept: MRI IMAGING | Facility: HOSPITAL | Age: 34
Discharge: HOME | End: 2024-09-20
Admitting: INTERNAL MEDICINE
Payer: COMMERCIAL

## 2024-09-20 DIAGNOSIS — E22.1 HYPERPROLACTINEMIA: ICD-10-CM

## 2024-09-20 DIAGNOSIS — D35.2 PITUITARY MICROADENOMA: ICD-10-CM

## 2024-09-20 PROCEDURE — 0 GADOBENATE DIMEGLUMINE 529 MG/ML SOLUTION: Performed by: INTERNAL MEDICINE

## 2024-09-20 PROCEDURE — A9577 INJ MULTIHANCE: HCPCS | Performed by: INTERNAL MEDICINE

## 2024-09-20 PROCEDURE — 70553 MRI BRAIN STEM W/O & W/DYE: CPT

## 2024-09-20 RX ADMIN — GADOBENATE DIMEGLUMINE 15 ML: 529 INJECTION, SOLUTION INTRAVENOUS at 06:57

## 2024-09-25 RX ORDER — DEXAMETHASONE 1 MG
1 TABLET ORAL ONCE
Qty: 1 TABLET | Refills: 0 | Status: SHIPPED | OUTPATIENT
Start: 2024-09-25 | End: 2024-09-25

## 2024-10-01 ENCOUNTER — TELEPHONE (OUTPATIENT)
Dept: ENDOCRINOLOGY | Facility: CLINIC | Age: 34
End: 2024-10-01
Payer: COMMERCIAL

## 2024-10-01 NOTE — TELEPHONE ENCOUNTER
PHARMACY CALLED THEY ARE OUT OF THE 1 MG TABLET OF DEXAMETHASONE AND WOULD LIKE TO SUBSTITUTE THE .5 MG TABLET.    PLEASE ADVISE      [id number] - [facility] - [place], [place] - [address] AT [facility] - [phone] [facility] [phone] FX

## 2024-10-17 ENCOUNTER — LAB (OUTPATIENT)
Dept: LAB | Facility: HOSPITAL | Age: 34
End: 2024-10-17
Payer: COMMERCIAL

## 2024-10-17 DIAGNOSIS — D35.2 PITUITARY MICROADENOMA: ICD-10-CM

## 2024-10-17 LAB — CORTIS AM PEAK SERPL-MCNC: 0.44 MCG/DL

## 2024-10-17 PROCEDURE — 82533 TOTAL CORTISOL: CPT

## 2024-10-17 PROCEDURE — 84305 ASSAY OF SOMATOMEDIN: CPT | Performed by: INTERNAL MEDICINE

## 2024-10-17 PROCEDURE — 80299 QUANTITATIVE ASSAY DRUG: CPT

## 2024-10-17 PROCEDURE — 36415 COLL VENOUS BLD VENIPUNCTURE: CPT

## 2024-10-21 LAB — IGF-I SERPL-MCNC: 326 NG/ML (ref 84–281)

## 2024-10-29 DIAGNOSIS — D35.2 PITUITARY MICROADENOMA: Primary | ICD-10-CM

## 2024-10-30 LAB — DEXAMETHASONE SERPL-MCNC: 435 NG/DL

## 2024-11-04 ENCOUNTER — OFFICE VISIT (OUTPATIENT)
Dept: FAMILY MEDICINE CLINIC | Facility: CLINIC | Age: 34
End: 2024-11-04
Payer: COMMERCIAL

## 2024-11-04 VITALS
RESPIRATION RATE: 20 BRPM | HEART RATE: 65 BPM | TEMPERATURE: 97.5 F | DIASTOLIC BLOOD PRESSURE: 70 MMHG | BODY MASS INDEX: 30.07 KG/M2 | OXYGEN SATURATION: 99 % | SYSTOLIC BLOOD PRESSURE: 120 MMHG | WEIGHT: 163.4 LBS | HEIGHT: 62 IN

## 2024-11-04 DIAGNOSIS — M79.10 MYALGIA: Primary | ICD-10-CM

## 2024-11-04 DIAGNOSIS — E66.3 OVERWEIGHT (BMI 25.0-29.9): ICD-10-CM

## 2024-11-04 DIAGNOSIS — D35.2 PITUITARY MICROADENOMA: ICD-10-CM

## 2024-11-04 PROCEDURE — 99204 OFFICE O/P NEW MOD 45 MIN: CPT | Performed by: FAMILY MEDICINE

## 2024-11-04 NOTE — PROGRESS NOTES
Chief Complaint  NP / establish PCP , weight gain , and Muscle Pain    Subjective          Thomas Noriega presents to [facility]    History of Present Illness  The patient presents for evaluation of multiple medical concerns.    She has a pituitary adenoma, which was evaluated through MRIs in 2023 and 2024, showing no significant changes. She has been under the care of an endocrinologist, who has conducted numerous lab tests. A trial period without medication was initiated in 02/2024, but stopped due to her concerns about hair loss and weight gain. However, these symptoms persisted even after discontinuing the medication. She is considering resuming cabergoline treatment for her cataracts, pending MRI results. Her insulin growth factor hormone levels were found to be high, prompting a retest. Her insulin growth factor levels have been consistently increasing, from 271 in 10/2023 to 326 in 10/2024.    She experiences diffuse body pain, particularly in her neck and shoulders, which she attributes to her sedentary lifestyle and desk work. Despite attempts to incorporate more physical activity into her routine, she continues to experience chronic pain, especially in her lower back. She rates her pain as 7 out of 10 when transitioning from sitting to standing or vice versa. She does not take any pain medication and prefers to avoid medication unless necessary.    She has struggled with weight loss since the birth of her second child, despite significant dietary changes and increased exercise. She believes her weight gain may be due to internal factors rather than lifestyle choices. She has considered physical therapy for her pelvic floor to strengthen her core. She did not have gestational diabetes during her pregnancies.  She doesn't necessarily want to take medications for weight loss    The following portions of the patient's history were reviewed and updated as appropriate:  allergies, current medications, past family history, past medical history, past social history, past surgical history, and problem list.    Objective      Physical Exam  Vitals reviewed.   Cardiovascular:      Rate and Rhythm: Normal rate.      Heart sounds: Normal heart sounds.   Pulmonary:      Effort: Pulmonary effort is normal.      Breath sounds: Normal breath sounds.   Neurological:      Mental Status: She is alert.        Physical Exam      Result Review :       Results               Assessment and Plan    Diagnoses and all orders for this visit:    1. Myalgia (Primary)  -     Cancel: Vitamin D,25-Hydroxy  -     Cancel: Vitamin B12  -     Cancel: VICENTA by IFA, Reflex 9-biomarkers profile  -     Cancel: Sedimentation Rate  -     Cancel: C-reactive Protein  -     Cancel: Comprehensive Metabolic Panel  -     Cancel: Hemoglobin A1c  -     Cancel: CK  -     VICENTA by IFA, Reflex 9-biomarkers profile  -     Comprehensive Metabolic Panel  -     Vitamin D,25-Hydroxy  -     Vitamin B12  -     Sedimentation Rate  -     C-reactive Protein  -     Rheumatoid Factor  -     Hemoglobin A1c  -     CK    2. Overweight (BMI 25.0-29.9)  -     Cancel: Vitamin D,25-Hydroxy  -     Cancel: Vitamin B12  -     Cancel: VICENTA by IFA, Reflex 9-biomarkers profile  -     Cancel: Sedimentation Rate  -     Cancel: C-reactive Protein  -     Cancel: Comprehensive Metabolic Panel  -     Cancel: Hemoglobin A1c  -     Cancel: CK  -     VICENTA by IFA, Reflex 9-biomarkers profile  -     Comprehensive Metabolic Panel  -     Vitamin D,25-Hydroxy  -     Vitamin B12  -     Sedimentation Rate  -     C-reactive Protein  -     Rheumatoid Factor  -     Hemoglobin A1c  -     CK    3. BMI 29.0-29.9,adult  -     Cancel: Vitamin D,25-Hydroxy  -     Cancel: Vitamin B12  -     Cancel: VICENTA by IFA, Reflex 9-biomarkers profile  -     Cancel: Sedimentation Rate  -     Cancel: C-reactive Protein  -     Cancel: Comprehensive Metabolic Panel  -     Cancel: Hemoglobin A1c  -      Cancel: CK  -     VICENTA by IFA, Reflex 9-biomarkers profile  -     Comprehensive Metabolic Panel  -     Vitamin D,25-Hydroxy  -     Vitamin B12  -     Sedimentation Rate  -     C-reactive Protein  -     Rheumatoid Factor  -     Hemoglobin A1c  -     CK      Assessment & Plan  1. Diffuse body pain.  Symptoms are suggestive of fibromyalgia. Thyroid function was assessed in August 2023, revealing slightly elevated thyroid antibodies but overall normal function. A comprehensive lab workup will be conducted today to rule out autoimmune conditions. If any abnormalities are detected, a referral to Rheumatology will be made for further evaluation. She is advised to maintain a regular exercise regimen, incorporating strength training to enhance metabolism.     2. Pituitary adenoma.  Cabergoline was discussed but not resumed. An insulin growth factor hormone test came back high, and she is waiting for lab orders to retest.  Continue care with endocrinology.    3. Difficulty losing weight.  Despite efforts to change diet and incorporate exercise, weight remains stable. Thyroid function was screened in August 2024, and no rescreening is necessary. If weight loss medications are considered, Adipex or phentermine may be used temporarily for 3 to 6 months to boost metabolism and control appetite. Labs will be conducted today to further investigate underlying causes.          Follow Up   Return in about 1 year (around 11/4/2025) for Annual physical.    Patient or patient representative verbalized consent for the use of Ambient Listening during the visit with  Maureen Kaye DO for chart documentation. 11/4/2024  18:15 EST  Patient was given instructions and counseling regarding her condition or for health maintenance advice. Please see specific information pulled into the AVS if appropriate.

## 2024-11-06 DIAGNOSIS — R76.8 POSITIVE ANA (ANTINUCLEAR ANTIBODY): ICD-10-CM

## 2024-11-06 DIAGNOSIS — M79.10 MYALGIA: Primary | ICD-10-CM

## 2024-11-06 LAB
25(OH)D3+25(OH)D2 SERPL-MCNC: 23.1 NG/ML (ref 30–100)
ALBUMIN SERPL-MCNC: 4.4 G/DL (ref 3.9–4.9)
ALP SERPL-CCNC: 61 IU/L (ref 44–121)
ALT SERPL-CCNC: 12 IU/L (ref 0–32)
ANA SER QL IF: POSITIVE
ANA SPECKLED TITR SER: ABNORMAL {TITER}
AST SERPL-CCNC: 14 IU/L (ref 0–40)
BILIRUB SERPL-MCNC: 0.3 MG/DL (ref 0–1.2)
BUN SERPL-MCNC: 12 MG/DL (ref 6–20)
BUN/CREAT SERPL: 14 (ref 9–23)
CALCIUM SERPL-MCNC: 9.6 MG/DL (ref 8.7–10.2)
CENTROMERE B AB SER-ACNC: <0.2 AI (ref 0–0.9)
CHLORIDE SERPL-SCNC: 105 MMOL/L (ref 96–106)
CHROMATIN AB SERPL-ACNC: <0.2 AI (ref 0–0.9)
CK SERPL-CCNC: 81 U/L (ref 32–182)
CO2 SERPL-SCNC: 24 MMOL/L (ref 20–29)
CREAT SERPL-MCNC: 0.86 MG/DL (ref 0.57–1)
CRP SERPL-MCNC: <1 MG/L (ref 0–10)
DSDNA AB SER-ACNC: <1 IU/ML (ref 0–9)
EGFRCR SERPLBLD CKD-EPI 2021: 92 ML/MIN/1.73
ENA JO1 AB SER-ACNC: <0.2 AI (ref 0–0.9)
ENA RNP AB SER-ACNC: 0.3 AI (ref 0–0.9)
ENA SCL70 AB SER-ACNC: <0.2 AI (ref 0–0.9)
ENA SM AB SER-ACNC: <0.2 AI (ref 0–0.9)
ENA SS-A AB SER-ACNC: <0.2 AI (ref 0–0.9)
ENA SS-B AB SER-ACNC: <0.2 AI (ref 0–0.9)
ERYTHROCYTE [SEDIMENTATION RATE] IN BLOOD BY WESTERGREN METHOD: 2 MM/HR (ref 0–32)
GLOBULIN SER CALC-MCNC: 2.5 G/DL (ref 1.5–4.5)
GLUCOSE SERPL-MCNC: 105 MG/DL (ref 70–99)
HBA1C MFR BLD: 5.5 % (ref 4.8–5.6)
LABORATORY COMMENT REPORT: ABNORMAL
Lab: ABNORMAL
Lab: ABNORMAL
POTASSIUM SERPL-SCNC: 3.9 MMOL/L (ref 3.5–5.2)
PROT SERPL-MCNC: 6.9 G/DL (ref 6–8.5)
RHEUMATOID FACT SERPL-ACNC: <10 IU/ML
SODIUM SERPL-SCNC: 143 MMOL/L (ref 134–144)
VIT B12 SERPL-MCNC: 616 PG/ML (ref 232–1245)

## 2024-11-11 ENCOUNTER — TELEPHONE (OUTPATIENT)
Dept: FAMILY MEDICINE CLINIC | Facility: CLINIC | Age: 34
End: 2024-11-11
Payer: COMMERCIAL

## 2024-11-11 NOTE — TELEPHONE ENCOUNTER
Caller: Thomas Noriega    Relationship: Self    Best call back number: [phone]     What specialty or service is being requested: RHEUMATOLOGY     What is the office phone number: [phone]    Any additional details: PATIENT STATES SHE WAS GIVEN A REFERRAL TO RHEUMATOLOGY IN [place] BUT THEY ARE BOOKING OUT UNTIL MARCH 2025. SHE WOULD LIKE A NEW REFERRAL SENT TO [facility] IN [place].

## 2024-11-12 ENCOUNTER — PATIENT ROUNDING (BHMG ONLY) (OUTPATIENT)
Dept: FAMILY MEDICINE CLINIC | Facility: CLINIC | Age: 34
End: 2024-11-12
Payer: COMMERCIAL

## 2024-11-18 ENCOUNTER — TELEPHONE (OUTPATIENT)
Dept: ENDOCRINOLOGY | Facility: CLINIC | Age: 34
End: 2024-11-18
Payer: COMMERCIAL

## 2024-11-18 DIAGNOSIS — D35.2 PITUITARY MICROADENOMA: ICD-10-CM

## 2024-11-18 NOTE — TELEPHONE ENCOUNTER
Pt called to check status of the labs ordered by [employer], looks like we just got that fax today, pt would like a call about results for this.

## 2024-11-19 NOTE — TELEPHONE ENCOUNTER
Please contact patient.  IGF-I on repeat labs at [facility] was normal.  273 with reference range .  Further action is not required at this time.

## 2024-12-09 ENCOUNTER — LAB (OUTPATIENT)
Facility: HOSPITAL | Age: 34
End: 2024-12-09
Payer: COMMERCIAL

## 2024-12-09 ENCOUNTER — OFFICE VISIT (OUTPATIENT)
Age: 34
End: 2024-12-09
Payer: COMMERCIAL

## 2024-12-09 VITALS
WEIGHT: 165.4 LBS | DIASTOLIC BLOOD PRESSURE: 78 MMHG | BODY MASS INDEX: 30.44 KG/M2 | HEART RATE: 92 BPM | SYSTOLIC BLOOD PRESSURE: 120 MMHG | OXYGEN SATURATION: 98 % | HEIGHT: 62 IN

## 2024-12-09 DIAGNOSIS — M79.10 MYALGIA: ICD-10-CM

## 2024-12-09 DIAGNOSIS — E55.9 VITAMIN D DEFICIENCY: ICD-10-CM

## 2024-12-09 DIAGNOSIS — R76.8 POSITIVE ANA (ANTINUCLEAR ANTIBODY): Primary | ICD-10-CM

## 2024-12-09 LAB
C3 SERPL-MCNC: 117 MG/DL (ref 82–167)
C4 SERPL-MCNC: 23 MG/DL (ref 14–44)
CREAT UR-MCNC: 40.7 MG/DL
PROT ?TM UR-MCNC: 7 MG/DL
PROT/CREAT UR: 172 MG/G CREA (ref 0–200)

## 2024-12-09 PROCEDURE — 86160 COMPLEMENT ANTIGEN: CPT | Performed by: STUDENT IN AN ORGANIZED HEALTH CARE EDUCATION/TRAINING PROGRAM

## 2024-12-09 PROCEDURE — 84156 ASSAY OF PROTEIN URINE: CPT | Performed by: STUDENT IN AN ORGANIZED HEALTH CARE EDUCATION/TRAINING PROGRAM

## 2024-12-09 PROCEDURE — 36415 COLL VENOUS BLD VENIPUNCTURE: CPT | Performed by: STUDENT IN AN ORGANIZED HEALTH CARE EDUCATION/TRAINING PROGRAM

## 2024-12-09 PROCEDURE — 86376 MICROSOMAL ANTIBODY EACH: CPT | Performed by: STUDENT IN AN ORGANIZED HEALTH CARE EDUCATION/TRAINING PROGRAM

## 2024-12-09 PROCEDURE — 86800 THYROGLOBULIN ANTIBODY: CPT | Performed by: STUDENT IN AN ORGANIZED HEALTH CARE EDUCATION/TRAINING PROGRAM

## 2024-12-09 PROCEDURE — 82570 ASSAY OF URINE CREATININE: CPT | Performed by: STUDENT IN AN ORGANIZED HEALTH CARE EDUCATION/TRAINING PROGRAM

## 2024-12-09 NOTE — PROGRESS NOTES
RHEUMATOLOGY NEW PATIENT VISIT  2024  Patient Name: Thomas Noriega : 1990 Medical Record: [id number]  PCP: Maureen Kaye DO  Referring provider: Maureen Kaye    REASON FOR CONSULTATION  Myalgia, positive VICENTA    History of Present Illness    Thomas Noriega is a 34 y.o. female with past medical history of Pituitary microadenoma, diagnosed , overweight, possible fibromyalgia, was referred to Rheumatology  for evaluation of positive VICENTA, muscle pains, and problems losing weight.      She has been experiencing difficulty in losing weight since the birth of her son three years ago.   She has been experiencing widespread pain, which she initially attributed to her sedentary lifestyle.   She describes muscle aches in her neck, shoulders, back, and hips since she was 17. She does not report any associated weakness or difficulty with mobility. Her joints feel stiff upon waking, but this improves with movement.  When she complained to her primary care doctor  She had complicit blood work done, 1 of which came as positive VICENTA ,titer 1:320 with a speckled pattern. Rest of the KURTIS sub-serology was negative (includes-dsDNA, SSA/SSB etc).    Other pertinent Review of systems:  She reports poor sleep quality, averaging six hours per night, and does not feel refreshed upon waking. She does not report any mood disorders, persistent headaches, or visual changes. She occasionally experiences canker sores during periods of high stress. She does not report any recurrent sinus infections, eye inflammation, or blood clots. She does not have any issues with swallowing, diarrhea, or blood in her stool or urine. She does not experience chest pain with deep breaths or joint swelling. She has not traveled recently or had any tick bites.    She has two children, aged 3 and 6, and has never had a miscarriage.    She has a history of pituitary adenoma, diagnosed in 2017, which has not significantly impacted her  health except during her attempts to conceive. She was on cabergoline from  to 2024 to regulate her menstrual cycle and prevent osteoporosis. She has since stopped the medication and is exploring natural supplements.    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 She does not smoke. She drinks alcohol occasionally. She denies any recreational drug use.    FAMILY HISTORY  She is not aware of any family history of autoimmune disease like lupus, rheumatoid arthritis, scleroderma, vasculitis, sarcoidosis. Her aunt has fibromyalgia.    Results  Pertinent laboratory Studies  2024  VICENTA-1: 320, speckled pattern, KURTIS panel  negative, CK wnl, CMP within normal, vitamin D-23.1 low, A1c-5.5, RF-negative, CRP is negative, ESR negative  TSH -3.6 wnl. Insulin-Growth-like factor slightly elevated, prolactin elevated       Past Medical History:   Diagnosis Date    First pregnancy     Papanicolaou smear 2018    normal    Pituitary adenoma     Pituitary microadenoma 2017    benign    Pituitary microadenoma      Social History     Socioeconomic History    Marital status:      Spouse name: Barry Noriega     Number of children: 1   Tobacco Use    Smoking status: Never    Smokeless tobacco: Never   Vaping Use    Vaping status: Never Used   Substance and Sexual Activity    Alcohol use: Yes     Comment: light per merlin    Drug use: Never    Sexual activity: Yes     Partners: Male     Birth control/protection: None, Surgical     Comment: Tubal igation     OB History          2    Para   2    Term   2            AB        Living   2         SAB        IAB        Ectopic        Molar        Multiple   0    Live Births   2               Immunization History   Administered Date(s) Administered    COVID-19 (PFIZER) Purple Cap Monovalent 2021, 09/15/2021    DTaP 1992, 1993, 1993, 06/10/1994, 1998    Flu Vaccine Quad PF >36MO 10/01/2021    Fluzone (or Fluarix &  "Flulaval for VFC) >6mos 10/08/2020    HPV Quadrivalent 2007, 2007, 2008    Hep A, Unspecified 2010, 2011    Hepatitis A 2019    Hepatitis B Adult/Adolescent IM 1992, 1993, 1993    Hib (PRP-OMP) 1992, 1993, 1993, 06/10/1994    IPV 1992, 1993, 06/10/1994, 1998    Influenza, Unspecified 10/07/2011    MMR 06/10/1994, 1998    Meningococcal MCV4P (Menactra) 2011    Td (TDVAX) 2004    Varicella 1995      Family History   Problem Relation Age of Onset    Hypertension Mother     Other Sister         19  in car accident    Cancer Paternal Aunt     Breast cancer Paternal Aunt     Thyroid disease Maternal Grandfather     Cancer Paternal Grandmother     Brain cancer Paternal Grandmother     Lung cancer Paternal Grandfather     Hyperlipidemia Paternal Grandfather     Hypertension Other      Past Surgical History:   Procedure Laterality Date     SECTION N/A 2018    Procedure:  SECTION PRIMARY;  Surgeon: Colby Grier MD;  Location:  [facility];  Service:      SECTION N/A 2021    Procedure:  SECTION REPEAT;  Surgeon: Colby Grier MD;  Location:  [facility];  Service: Obstetrics/Gynecology;  Laterality: N/A;    TUBAL ABDOMINAL LIGATION      WISDOM TOOTH EXTRACTION  2019     ALLERGIES  No Known Allergies  PHYSICAL EXAM  Physical Exam      Vitals:    24 0810   BP: 120/78   BP Location: Left arm   Patient Position: Sitting   Cuff Size: Adult   Pulse: 92   TempSrc: Oral   SpO2: 98%   Weight: 75 kg (165 lb 6.4 oz)   Height: 157.5 cm (62.01\")     GENERAL: The patient appeared to be in no distress. AXOX3.  SKIN: There were no suspicious rashes or lesions  HEENT: Head was atraumatic and normocephalic. The patient was anicteric. Pupils were equally reactive to light. Ears: There were no lesions. Nose: No lesions were noted. No oral " ulcers in the mouth. Normal salivary pool. Eyes are normal. no signs of lymphadenopathy.  NECK: Supple. There was no JVD. No bruit was heard over the carotids.  HEART: S1 and S2, regular, no murmurs, rubs, or gallops  LUNGS: Air entry was good, clear to auscultation bilaterally. No wheezes/ rhonchi/ rales.  ABDOMEN: Obese, soft and nontender. Bowel sounds were present. No organomegaly.  EXT: No clubbing, cyanosis, edema.  NEUROLOGICAL: There was no focal deficit. Cranial nerves II through XII were intact. Motor strength 5/5.  PSYCHIATRIC: no homicidal/ suicidal ideations.    Musculoskeletal:  Gait: Ambulates without assistance, normal gait  Spine: Full ROM  Upper Extremities  Shoulder: Normal. Full ROM, no crepitus  Elbow: Normal. Full ROM, no synovitis, no deformity  Wrist: Normal. Full ROM, no synovitis, no deformity, no ulnar deviation  Fingers: Normal. No sclerodactyly, no active raynaud's, no ulcers  MCPs: Normal. no synovitis, no deformity  PIPs: Normal. no synovitis, no deformity  DIPs: Normal. no synovitis, no deformity  Nails: Normal. No pitting, no telangiectasias.  Lower Extremities  Hip: Normal. Full ROM, no tenderness to palpation  Knee: Normal. Full ROM, no erythema/swelling/laxity/crepitus.  Ankle: Normal. Full ROM, no swelling or erythema  MTPs: Normal. No swelling or erythema      LABS AND IMAGING ll laboratory data was reviewed and relevant values are noted as below.  See HPI     MRI Brain With & Without Contrast  Narrative: MRI BRAIN W WO CONTRAST    Date of Exam: 9/20/2024 6:18 AM EDT    Indication: elevated prolactin, pituitary microadenoma.     Comparison: MRI brain 11/11/2023    Technique:  Routine multiplanar/multisequence sequence images of the brain were obtained before and after the uneventful administration of 15 mL Multihance.    Findings:  There is no diffusion restriction to suggest acute infarct.     There is no evidence of acute or chronic intracranial hemorrhage. No mass effect or  midline shift. No abnormal extra-axial collections.     The basal ganglia, brainstem and cerebellum appear within normal limits. Midline structures are intact. No significant cortical abnormality is identified.    The pituitary gland measures 8 mm in craniocaudal direction and is convex superiorly. Dynamic postcontrast images through the sella demonstrate a 5 x 5 x 5 mm round area of hypoenhancement along the right pituitary consistent with a pituitary   microadenoma. The pituitary infundibulum is midline and not thickened.    Calvarial and superficial soft tissue signal is within normal limits. Orbits appear unremarkable. There is trace mucosal thickening throughout the paranasal sinuses. The mastoid air cells are clear.    There is no abnormal intracranial enhancement. There is normal enhancement within the intracranial vasculature including the dural venous sinuses.  Impression: Impression:  Stable appearance of a 5 mm hypoenhancing focus within the right pituitary gland compatible with a microadenoma.    Electronically Signed: Ni Mccann MD    9/20/2024 10:39 AM EDT    Workstation ID: [id number]     ASSESSMENT AND PLAN  Assessment & Plan    Thomas Stefania Noriega is a 34 y.o. female who is being seen for evaluation of positive VICENTA, non-specific joint pain and aches    She has notable VICENTA but lacks typical clinical features often associated with lupus. Her aches, intermittent joint sxs are non-specific.   In addition, her anti-ds DNA, anti-Hsieh, SCL 70, and Sjogren's tests were all negative, indicating that the initial screening may not be significant. While I have suspicion for an lupus or other CTD  I will recommend obtaining additional testing to complete the work up.  Thyroid antibodies will be checked to rule out any thyroid-related autoimmune issues.   A urine test will be conducted to check for protein spillage.      She reports muscle pains in her neck, shoulders, back, and hips, which have been present  since she was 17. There is no associated weakness, but transitioning from prolonged sitting is painful. Her strength is intact in both upper and lower extremities. She is advised to maintain regular exercise and good sleep hygiene to help alleviate muscle pain.    She has been unable to lose weight since the birth of her second child 3 years ago. She is advised to follow a Mediterranean diet and engage in regular physical activity to help with weight management.    Her vitamin D levels were low, and she is currently taking supplements. Low vitamin D levels is associated with bone pains fatigue, loss of energy.  She is advised to follow up with her primary care provider to recheck her vitamin D levels.      Diagnoses and all orders for this visit:    1. Positive VICENTA (antinuclear antibody) (Primary)  -     Anti-Thyroglobulin Antibody  -     Thyroid Peroxidase Antibody  -     C3 Complement  -     C4 Complement  -     Protein / Creatinine Ratio, Urine - Urine, Clean Catch    2. Myalgia  -     Anti-Thyroglobulin Antibody  -     Thyroid Peroxidase Antibody  -     C3 Complement  -     C4 Complement  -     Protein / Creatinine Ratio, Urine - Urine, Clean Catch    3. Vitamin D deficiency        Patient or patient representative verbalized consent for the use of Ambient Listening during the visit with  Adelita Rose MD for chart documentation. 12/11/2024  13:32 EST    I spent 60 minutes caring for Thomas on this date of service. This time includes time spent by me in the following activities:preparing for the visit, reviewing tests, obtaining and/or reviewing a separately obtained history, performing a medically appropriate examination and/or evaluation , counseling and educating the patient/family/caregiver, ordering medications, tests, or procedures, documenting information in the medical record, and independently interpreting results and communicating that information with the patient/family/caregiver

## 2024-12-09 NOTE — PATIENT INSTRUCTIONS
You for your new patient visit today at the rheumatology clinic  You were evaluated for positive VICENTA, myalgias, joint pains  Your clinical features and exam does not indicate active autoimmune disease.  I have a low suspicion for lupus  Regarding your positive VICENTA, the  sub-serology and a KURTIS panel is negative.  Given your history of positive antithyroid antibodies I suspect this positive VICENTA is likely a false positive.  I will obtain a few test today to complete the workup.  Continue your vitamin D supplements for the low vitamin D levels, plan to become more active to her exercise, adequate sleep and proper diet.  Will contact you with results of your labs.

## 2024-12-10 LAB
THYROGLOB AB SERPL-ACNC: 118.8 IU/ML (ref 0–0.9)
THYROPEROXIDASE AB SERPL-ACNC: 39 IU/ML (ref 0–34)

## 2024-12-11 ENCOUNTER — PATIENT ROUNDING (BHMG ONLY) (OUTPATIENT)
Age: 34
End: 2024-12-11
Payer: COMMERCIAL

## 2024-12-11 PROBLEM — R76.8 POSITIVE ANA (ANTINUCLEAR ANTIBODY): Status: ACTIVE | Noted: 2024-12-11

## 2024-12-11 PROBLEM — E55.9 VITAMIN D DEFICIENCY: Status: ACTIVE | Noted: 2024-12-11

## 2024-12-11 PROBLEM — M79.10 MYALGIA: Status: ACTIVE | Noted: 2024-12-11

## 2025-02-25 ENCOUNTER — TELEPHONE (OUTPATIENT)
Dept: ENDOCRINOLOGY | Facility: CLINIC | Age: 35
End: 2025-02-25

## 2025-02-25 NOTE — TELEPHONE ENCOUNTER
Patient called office, stated that she has been having dizzy spells that are extreme. Stated it usually last about 5 minutes and its hard for her eyes to focus. Said this has been happening over the last 5 days but that it is occuring more than once throughout the day. Said that today it has lasted about 2 hours. She is concerned and wanted some guidance. She would like a call back.

## 2025-02-25 NOTE — TELEPHONE ENCOUNTER
Called the pt and told her she needs to go to the ED. To either call someone to drive her or call EMS.    She said she was thinking the same thing.    She is going to call someone to take her.    Please advise.

## 2025-02-26 ENCOUNTER — TELEPHONE (OUTPATIENT)
Dept: ENDOCRINOLOGY | Facility: CLINIC | Age: 35
End: 2025-02-26
Payer: COMMERCIAL

## 2025-02-26 NOTE — TELEPHONE ENCOUNTER
PATIENT STATES SHE CALLED YESTERDAY REGARDING SOME DIZZY SPELLS THAT SHE HAS BEEN HAVING. PATIENT STATES SHE WENT TO THE ER AND THEY TOLD HER TO FOLLOW UP WITH ENDOCRINOLOGY AND HAVE AN MRI.    PATIENT IS REQUESTING MRI BE ORDERED.     PATIENT WAS ADVISED BY THE ER STAFF TO HAVE SOME FOLLOW UP LAB WORK DONE.

## 2025-02-27 NOTE — TELEPHONE ENCOUNTER
Called the pt and she verbalized understanding to the message.     She would like to be seen by Dr Cohen. She was transferred to the front to be scheduled.

## 2025-02-28 ENCOUNTER — OFFICE VISIT (OUTPATIENT)
Dept: FAMILY MEDICINE CLINIC | Facility: CLINIC | Age: 35
End: 2025-02-28
Payer: COMMERCIAL

## 2025-02-28 VITALS
HEART RATE: 74 BPM | RESPIRATION RATE: 16 BRPM | TEMPERATURE: 97.3 F | OXYGEN SATURATION: 98 % | DIASTOLIC BLOOD PRESSURE: 80 MMHG | HEIGHT: 62 IN | WEIGHT: 163.4 LBS | BODY MASS INDEX: 30.07 KG/M2 | SYSTOLIC BLOOD PRESSURE: 110 MMHG

## 2025-02-28 DIAGNOSIS — R42 DIZZINESS: ICD-10-CM

## 2025-02-28 DIAGNOSIS — D35.2 PITUITARY MICROADENOMA: Primary | ICD-10-CM

## 2025-02-28 DIAGNOSIS — E22.1 HYPERPROLACTINEMIA: ICD-10-CM

## 2025-02-28 PROCEDURE — 99214 OFFICE O/P EST MOD 30 MIN: CPT | Performed by: FAMILY MEDICINE

## 2025-02-28 NOTE — PROGRESS NOTES
Chief Complaint  ER f/u ([facility] Dizziness, meclizine didn't help. Still having dizziness, all the time. Started 9am Tues morning)    Subjective          Thomas Noriega presents to [facility]    History of Present Illness  The patient is a 34-year-old female who presents for an ER follow-up.    She was seen at [facility] on 02/25/2025 for evaluation of dizziness. She reports experiencing episodes of dizziness, which began last Friday while she was at work. These episodes have been increasing in frequency, occurring once on Saturday, once on Sunday, and three times on Monday. The most recent episode occurred on Tuesday morning at 9 AM and has persisted since then. She describes the sensation as similar to being slightly intoxicated, requiring increased focus to maintain functionality. She has also noticed an increase in typographical errors over the past two days, which she attributes to a possible brain fog. Her  has not observed any changes in her speech. She has not made any alterations to her diet or sleep patterns. She has noticed an increase in visual floaters today, which have been more pronounced than usual over the past few days. She describes her overall sensation as more related to instability rather than balance issues, with a feeling of wobbliness rather than a fear of falling. She has not experienced any changes in her menstrual cycle, with her last period occurring the previous week.    She has a history of pituitary adenoma and is concerned about potential growth, prompting her to seek medical attention. She has requested an MRI and blood work to be conducted prior to her scheduled appointment with her endocrinologist on 03/12/2025. Her prolactin levels were not assessed during her recent lab work, but all other results were within normal limits. She was advised to have her thyroid function tested.      The following portions of the patient's  history were reviewed and updated as appropriate: allergies, current medications, past family history, past medical history, past social history, past surgical history, and problem list.    Objective      Physical Exam  Vitals reviewed.   HENT:      Right Ear: Tympanic membrane, ear canal and external ear normal.      Left Ear: Tympanic membrane, ear canal and external ear normal.   Cardiovascular:      Rate and Rhythm: Normal rate and regular rhythm.      Heart sounds: Normal heart sounds.   Pulmonary:      Effort: Pulmonary effort is normal.      Breath sounds: Normal breath sounds.   Neurological:      Mental Status: She is alert.   Psychiatric:         Mood and Affect: Mood normal.        Physical Exam      Result Review :       Results               Assessment and Plan    Diagnoses and all orders for this visit:    1. Pituitary microadenoma (Primary)  -     MRI Brain With & Without Contrast; Future  -     Prolactin  -     TSH+Free T4  -     ACTH  -     Cortisol  -     Insulin-like Growth Factor  -     Follicle Stimulating Hormone  -     Estradiol  -     Luteinizing Hormone    2. Dizziness  -     MRI Brain With & Without Contrast; Future  -     Prolactin  -     TSH+Free T4  -     ACTH  -     Cortisol  -     Insulin-like Growth Factor  -     Follicle Stimulating Hormone  -     Estradiol  -     Luteinizing Hormone    3. Hyperprolactinemia  -     Prolactin  -     TSH+Free T4  -     ACTH  -     Cortisol  -     Insulin-like Growth Factor  -     Follicle Stimulating Hormone  -     Estradiol  -     Luteinizing Hormone      Assessment & Plan  1. Dizziness.  Meclizine has not been effective. Previous labs, including electrolyte panel, TSH, blood count, and platelets, were normal. CTA of the neck and CT of the head and brain were also normal. An MRI will be ordered to further investigate the cause of her dizziness. Thyroid labs, cortisol, ACTH, insulin-like growth factor, estradiol, and prolactin levels will be checked.   She will try Epley maneuver.  If the MRI does not reveal any abnormalities, a referral to an ENT specialist may be considered.    2. Pituitary adenoma.  The patient is concerned about her pituitary adenoma growing, which may be contributing to her dizziness. An MRI will be ordered to assess the adenoma. She has an appointment scheduled with her endocrinologist next week for further evaluation.        Follow Up   Return if symptoms worsen or fail to improve.    Patient or patient representative verbalized consent for the use of Ambient Listening during the visit with  Maureen Kaye DO for chart documentation. 3/2/2025  13:46 EST  Patient was given instructions and counseling regarding her condition or for health maintenance advice. Please see specific information pulled into the AVS if appropriate.

## 2025-03-01 ENCOUNTER — HOSPITAL ENCOUNTER (OUTPATIENT)
Dept: MRI IMAGING | Facility: HOSPITAL | Age: 35
Discharge: HOME | End: 2025-03-01
Payer: COMMERCIAL

## 2025-03-01 DIAGNOSIS — R42 DIZZINESS: ICD-10-CM

## 2025-03-01 DIAGNOSIS — D35.2 PITUITARY MICROADENOMA: ICD-10-CM

## 2025-03-01 PROCEDURE — 25510000002 GADOBENATE DIMEGLUMINE 529 MG/ML SOLUTION: Performed by: FAMILY MEDICINE

## 2025-03-01 PROCEDURE — A9577 INJ MULTIHANCE: HCPCS | Performed by: FAMILY MEDICINE

## 2025-03-01 PROCEDURE — 70553 MRI BRAIN STEM W/O & W/DYE: CPT

## 2025-03-01 RX ADMIN — GADOBENATE DIMEGLUMINE 15 ML: 529 INJECTION, SOLUTION INTRAVENOUS at 11:08

## 2025-03-03 ENCOUNTER — OFFICE VISIT (OUTPATIENT)
Dept: ENDOCRINOLOGY | Facility: CLINIC | Age: 35
End: 2025-03-03
Payer: COMMERCIAL

## 2025-03-03 VITALS
DIASTOLIC BLOOD PRESSURE: 75 MMHG | OXYGEN SATURATION: 98 % | WEIGHT: 167 LBS | SYSTOLIC BLOOD PRESSURE: 114 MMHG | HEIGHT: 62 IN | BODY MASS INDEX: 30.73 KG/M2 | HEART RATE: 73 BPM

## 2025-03-03 DIAGNOSIS — R42 DIZZINESS: ICD-10-CM

## 2025-03-03 DIAGNOSIS — D35.2 PITUITARY MICROADENOMA: Primary | ICD-10-CM

## 2025-03-03 DIAGNOSIS — E22.1 HYPERPROLACTINEMIA: ICD-10-CM

## 2025-03-03 PROCEDURE — 99214 OFFICE O/P EST MOD 30 MIN: CPT | Performed by: INTERNAL MEDICINE

## 2025-03-03 NOTE — Clinical Note
She came in early due to an acute change in symptoms (dizziness). PCP had already ordered labs/imaging by the time I saw her. She will still see you 3/12/25 I believe.

## 2025-03-03 NOTE — PROGRESS NOTES
"Chief Complaint   Patient presents with    Adrenal Problem        HPI   Thomas Noriega is a 34 y.o. female had concerns including Adrenal Problem.    Here early for follow-up due to new onset of dizziness. She typically sees Dr. Emerson and has an appt on 3/12/25.     Dizzy spells started just over a week ago. She had one episode daily for a few days, then three episodes a week ago Monday.   Then since Monday a week ago she has experienced constant dizziness. Feels like she is \"on a boat\" like a rocking sensation. Seems worse with certain lighting - the more dim the lights are the worse the symptoms are and also with more shadows.     Wakes with the symptoms. Nothing helps.   She was seen in the ER and got meclizine but no improvement.     She had a massage over the weekend (few days ago) and she felt some fluid in her right ear. She tried the Epley maneuver with no improvement.   Has noted a bubbling sound in her right ear and as of this morning is feeling a bit better.   Has a very mild HA - 1-2/10.     Has some seasonal allergies. Has rhinorrhea as of this past weekend - about a week after dizziness onset.   Not taking allergy meds or Flonase.     No orthostasis. Appetite slightly decreased the last few days which is unusual for her.   No nausea.     Menses are regular and no breast discharge.   Was on cabergoline in the past but didn't change the size of the microadenoma and she prefers not to take a med unless needed.   History of tubal.   Prolactin levels have been very elevated in the past (200s) and she doesn't recall having this symptom.     PCP checked labs last week. Results are pending.   MRI was also updated and the microadenoma is stable.     The following portions of the patient's history were reviewed and updated as appropriate: allergies, current medications, past family history, past medical history, past social history, past surgical history, and problem list.    Review of Systems " "  Constitutional: Negative.    Neurological:  Positive for dizziness.        /75   Pulse 73   Ht 157.5 cm (62\")   Wt 75.8 kg (167 lb)   LMP 02/24/2025 (Exact Date)   SpO2 98%   BMI 30.54 kg/m²      Physical Exam  Vitals reviewed.   Constitutional:       Appearance: Normal appearance.   Neck:      Thyroid: No thyroid mass or thyromegaly.   Cardiovascular:      Rate and Rhythm: Normal rate.   Pulmonary:      Effort: Pulmonary effort is normal.   Neurological:      General: No focal deficit present.      Mental Status: She is alert. Mental status is at baseline.   Psychiatric:         Mood and Affect: Mood normal.         Behavior: Behavior normal.              LABS AND IMAGING   CMP  Lab Results   Component Value Date    GLUCOSE 105 (H) 11/04/2024    BUN 12 11/04/2024    CREATININE 0.86 11/04/2024    BCR 14 11/04/2024    K 3.9 11/04/2024    CO2 24 11/04/2024    CALCIUM 9.6 11/04/2024    ALBUMIN 4.4 11/04/2024    AST 14 11/04/2024    ALT 12 11/04/2024        CBC w/DIFF   Lab Results   Component Value Date    WBC 10.60 06/05/2021    RBC 3.66 (L) 06/05/2021    HGB 10.3 (L) 06/05/2021    HCT 32.0 (L) 06/05/2021    MCV 87.4 06/05/2021    MCH 28.1 06/05/2021    MCHC 32.2 06/05/2021    RDW 14.8 06/05/2021    RDWSD 47.2 06/05/2021    MPV 10.6 06/05/2021     06/05/2021    NEUTRORELPCT 73.6 06/05/2021    LYMPHORELPCT 18.0 (L) 06/05/2021    MONORELPCT 6.3 06/05/2021    EOSRELPCT 1.1 06/05/2021    BASORELPCT 0.4 06/05/2021    AUTOIGPER 0.6 (H) 06/05/2021    NEUTROABS 7.80 (H) 06/05/2021    LYMPHSABS 1.91 06/05/2021    MONOSABS 0.67 06/05/2021    EOSABS 0.12 06/05/2021    BASOSABS 0.04 06/05/2021    AUTOIGNUM 0.06 (H) 06/05/2021    NRBC 0.0 06/05/2021     TSH  Lab Results   Component Value Date    TSH 3.600 08/30/2024    TSH 1.340 10/04/2023     T4  Lab Results   Component Value Date    FREET4 1.05 08/30/2024     TPO  Lab Results   Component Value Date    THYROIDAB 39 (H) 12/09/2024     Lab Results   Component " Value Date    PROLACTIN 48.00 (H) 08/30/2024    INSLIKE 326 (H) 10/17/2024    CORTISOL 5.27 10/04/2023    LABCORTAM 0.44 10/17/2024     11/7/24 IGF-1 ([facility]) 273 ng/ml, normal range )    MRI BRAIN W WO CONTRAST     Date of Exam: 3/1/2025 10:08 AM EST     Indication: pituitary adenoma, dizziness.     Comparison: 9/20/2024, 11/11/2023     Technique:  Routine multiplanar/multisequence sequence images of the brain were obtained before and after the uneventful administration of 15 mL Multihance.     FINDINGS:  No abnormal diffusion restriction is observed to indicate acute or subacute focal ischemia. No significant abnormal intraparenchymal signal is identified. There is no evidence for abnormal cerebral edema. There is no evidence for abnormal focal   enhancement or abnormal enhancing mass. There is no mass effect or midline shift. The ventricular system is nondilated. The basilar cisterns are patent.     Dynamic phase postcontrast imaging through the sella turcica and pituitary gland again demonstrates evidence for a small microadenoma involving the right aspect of the adenohypophysis of the pituitary. This finding measures approximately 5 mm and is   stable.      No significant extra-axial fluid collections are identified.      No significant abnormal signal is observed involving the paranasal sinuses or mastoid air cells.     IMPRESSION:  1.Stable pituitary microadenoma involving the right aspect of the pituitary measuring 5 mm.  2.Otherwise negative MRI of the head with and without contrast.           Electronically Signed: Rajat Krishna MD    3/1/2025 11:33 AM EST     Assessment and Plan    Diagnoses and all orders for this visit:    1. Pituitary microadenoma (Primary)  MRI from November 2023 with 5 mm microadenoma.  Previous imaging in 2017 measured this at 4.5 mm.  Scan updated 3/1/25 and is stable at 5 mm.   Evaluation of pituitary hormones has been unremarkable. AM cortisol which was slightly lower than  expected when drawn at ~9:30 AM in the past, but she displays no s/s of adrenal insufficiency.   PCP ordered all pituitary labs and these are pending from last week.     2. Hyperprolactinemia  Last level was mildly elevated. Has been on cabergoline in the past but noted no change in the pituitary microadenoma or symptoms.   Even with prolactin levels in 200s in the past, she had no symptoms.   History of tubal.   Discussed HA as a possible SE from high prolactin levels, maybe dizziness but seems less likely.   Recheck level pending.     3. Dizziness  MRI is stable.   Labs are pending.   ER eval unremarkable and no resolution with meclizine.   No s/s of adrenal insufficiency. Cortisol level is pending.   Consider inner ear/allergies/fluid. Epley maneuver at home hasn't helped.  Advised a trial of OTC antihistamines and flonase.  If no resolution, consider resuming cabergoline if the prolactin remains elevated.   Follow-up with Dr. Emerson as scheduled 3/12/25.        Return in about 9 days (around 3/12/2025) for next scheduled follow up. The patient was instructed to contact the clinic with any interval questions or concerns.    Electronically signed by: Alondra Cohen DO   Endocrinologist    Please note that portions of this note were completed with a voice recognition program.

## 2025-03-04 DIAGNOSIS — R42 DIZZINESS: Primary | ICD-10-CM

## 2025-03-04 LAB
ACTH PLAS-MCNC: 13.6 PG/ML (ref 7.2–63.3)
CORTIS SERPL-MCNC: 8.6 UG/DL (ref 6.2–19.4)
ESTRADIOL SERPL-MCNC: 29.9 PG/ML
FSH SERPL-ACNC: 6.7 MIU/ML
IGF-I SERPL-MCNC: 297 NG/ML (ref 84–281)
LH SERPL-ACNC: 7.9 MIU/ML
PROLACTIN SERPL-MCNC: 36.8 NG/ML (ref 4.8–33.4)
T4 FREE SERPL-MCNC: 1 NG/DL (ref 0.82–1.77)
TSH SERPL DL<=0.005 MIU/L-ACNC: 5.33 UIU/ML (ref 0.45–4.5)

## 2025-03-12 ENCOUNTER — OFFICE VISIT (OUTPATIENT)
Dept: ENDOCRINOLOGY | Facility: CLINIC | Age: 35
End: 2025-03-12
Payer: COMMERCIAL

## 2025-03-12 VITALS
SYSTOLIC BLOOD PRESSURE: 116 MMHG | HEIGHT: 62 IN | HEART RATE: 78 BPM | WEIGHT: 164 LBS | DIASTOLIC BLOOD PRESSURE: 70 MMHG | BODY MASS INDEX: 30.18 KG/M2 | OXYGEN SATURATION: 97 %

## 2025-03-12 DIAGNOSIS — R79.89 ELEVATED INSULIN-LIKE GROWTH FACTOR 1 (IGF-1) LEVEL: ICD-10-CM

## 2025-03-12 DIAGNOSIS — D35.2 PITUITARY MICROADENOMA: ICD-10-CM

## 2025-03-12 DIAGNOSIS — R76.8 THYROID ANTIBODY POSITIVE: ICD-10-CM

## 2025-03-12 DIAGNOSIS — R42 DIZZINESS: Primary | ICD-10-CM

## 2025-03-12 DIAGNOSIS — E22.1 HYPERPROLACTINEMIA: ICD-10-CM

## 2025-03-12 DIAGNOSIS — R79.89 ELEVATED TSH: ICD-10-CM

## 2025-03-12 PROCEDURE — 99214 OFFICE O/P EST MOD 30 MIN: CPT | Performed by: INTERNAL MEDICINE

## 2025-03-12 NOTE — PROGRESS NOTES
"Chief Complaint   Patient presents with    Pituitary Problem    Abnormal Lab        HPI   Thomas Noriega is a 34 y.o. female had concerns including Pituitary Problem and Abnormal Lab.      Patient presents for follow-up.  She was seen in early March by another provider in the office due to new onset dizziness.  She is here to discuss labs.  She also had imaging in early March.  Patient does report that symptoms have improved since last visit.  However, they are not completely resolved.    The following portions of the patient's history were reviewed and updated as appropriate: allergies, current medications, and past social history.    Review of Systems   Neurological:  Positive for dizziness.      /70   Pulse 78   Ht 157.5 cm (62.01\")   Wt 74.4 kg (164 lb)   LMP 02/24/2025 (Exact Date)   SpO2 97%   BMI 29.99 kg/m²      Physical Exam      Constitutional:  well developed; well nourished  no acute distress  appears stated age   ENT/Thyroid: not examined   Eyes: Conjunctiva: clear   Respiratory:  breathing is unlabored  clear to auscultation bilaterally   Cardiovascular:  regular rate and rhythm   Chest:  Not performed.   Abdomen: Not performed.   : Not performed.   Musculoskeletal: Not performed   Skin: not performed.   Neuro: mental status, speech normal   Psych: mood and affect are within normal limits       Labs/Imaging   Latest Reference Range & Units 12/09/24 09:12 02/28/25 12:25   ACTH 7.2 - 63.3 pg/mL  13.6   Cortisol 6.2 - 19.4 ug/dL  8.6   Insulin-Like Growth Factor-1 84 - 281 ng/mL  297 (H)   LH mIU/mL  7.9   FSH mIU/mL  6.7   Prolactin 4.8 - 33.4 ng/mL  36.8 (H)   Estradiol pg/mL  29.9   TSH Baseline 0.450 - 4.500 uIU/mL  5.330 (H)   Free T4 0.82 - 1.77 ng/dL  1.00   Thyroglobulin Ab 0.0 - 0.9 IU/mL 118.8 (H)    Thyroid Peroxidase Antibody 0 - 34 IU/mL 39 (H)    (H): Data is abnormally high       MRI BRAIN W WO CONTRAST     Date of Exam: 3/1/2025 10:08 AM EST     Indication: pituitary " adenoma, dizziness.     Comparison: 9/20/2024, 11/11/2023     Technique:  Routine multiplanar/multisequence sequence images of the brain were obtained before and after the uneventful administration of 15 mL Multihance.     FINDINGS:  No abnormal diffusion restriction is observed to indicate acute or subacute focal ischemia. No significant abnormal intraparenchymal signal is identified. There is no evidence for abnormal cerebral edema. There is no evidence for abnormal focal   enhancement or abnormal enhancing mass. There is no mass effect or midline shift. The ventricular system is nondilated. The basilar cisterns are patent.     Dynamic phase postcontrast imaging through the sella turcica and pituitary gland again demonstrates evidence for a small microadenoma involving the right aspect of the adenohypophysis of the pituitary. This finding measures approximately 5 mm and is   stable.      No significant extra-axial fluid collections are identified.      No significant abnormal signal is observed involving the paranasal sinuses or mastoid air cells.     IMPRESSION:  1.Stable pituitary microadenoma involving the right aspect of the pituitary measuring 5 mm.  2.Otherwise negative MRI of the head with and without contrast.           Electronically Signed: Rajat Krishna MD    3/1/2025 11:33 AM EST    Workstation ID: [id number]    Diagnoses and all orders for this visit:    1. Dizziness (Primary)  Patient reports this is improved since last visit though not fully resolved.  She will continue to monitor and contact the clinic if symptoms worsen again or fail to resolve.    2. Pituitary microadenoma  Most recent pituitary MRI obtained due to dizziness in March 2025 was stable in comparison to prior.  MRI from November 2023 with 5 mm microadenoma. Previous imaging in 2017 measured this at 4.5 mm.   Patient has had prior hormonal abnormalities including elevation of TSH, prolactin, IGF-I, see below.    3.  Hyperprolactinemia  Most recent prolactin was 36.8, mildly elevated but improved from prior testing.  MRI is stable.  Patient previously took cabergoline but did not have a significant improvement in symptoms.  She denies galactorrhea or irregular menses.  We reviewed the option of resuming cabergoline given laboratory abnormality though I suspect this is not the cause of her dizziness which is already improving.  Patient will remain off therapy at this time.     4. Elevated insulin-like growth factor 1 (IGF-1) level  Patient had prior elevation of IGF-I which was normal on repeat evaluation at [facility], [place].  Most recent value of 297 is reduced in comparison to prior.  We discussed this is unlikely to explain her dizziness.  I did offer repeat evaluation and again at [facility], [place].  Patient would like to defer at this time.    5. Elevated TSH  6. Thyroid antibody positive  Most recent laboratory evaluation with mild elevation of TSH, 5.33.  Her free T4 was normal.  Discussed that subclinical hypothyroidism is unlikely to explain her symptoms.  She does have known elevation of thyroid antibodies which does increase the long-term risk of developing hypothyroidism.  We discussed risk and benefits of initiation of low-dose statin.  Will plan to repeat labs at follow-up but defer initiation of medical therapy at this time, patient to contact the clinic in the interim between visits if she changes her mind regarding therapy.       Return in about 4 months (around 7/12/2025). The patient was instructed to contact the clinic with any interval questions or concerns.    Electronically signed by: Kaelyn Emerson MD   Endocrinologist    Dictated Utilizing Dragon Dictation

## 2025-06-05 RX ORDER — POLYMYXIN B SULFATE AND TRIMETHOPRIM 1; 10000 MG/ML; [USP'U]/ML
1 SOLUTION OPHTHALMIC EVERY 4 HOURS
Qty: 10 ML | Refills: 0 | Status: SHIPPED | OUTPATIENT
Start: 2025-06-05 | End: 2025-06-12

## 2025-07-15 ENCOUNTER — OFFICE VISIT (OUTPATIENT)
Dept: ENDOCRINOLOGY | Facility: CLINIC | Age: 35
End: 2025-07-15
Payer: COMMERCIAL

## 2025-07-15 VITALS
DIASTOLIC BLOOD PRESSURE: 72 MMHG | HEART RATE: 66 BPM | WEIGHT: 169.6 LBS | SYSTOLIC BLOOD PRESSURE: 110 MMHG | HEIGHT: 62 IN | OXYGEN SATURATION: 99 % | BODY MASS INDEX: 31.21 KG/M2

## 2025-07-15 DIAGNOSIS — E03.8 SUBCLINICAL HYPOTHYROIDISM: ICD-10-CM

## 2025-07-15 DIAGNOSIS — E22.1 HYPERPROLACTINEMIA: ICD-10-CM

## 2025-07-15 DIAGNOSIS — D35.2 PITUITARY MICROADENOMA: Primary | ICD-10-CM

## 2025-07-15 DIAGNOSIS — R79.89 HIGH SERUM INSULIN-LIKE GROWTH FACTOR 1 (IGF-1): ICD-10-CM

## 2025-07-15 LAB
PROLACTIN SERPL-MCNC: 46.3 NG/ML (ref 4.79–23.3)
T4 FREE SERPL-MCNC: 1.02 NG/DL (ref 0.92–1.68)
TSH SERPL DL<=0.05 MIU/L-ACNC: 5.72 UIU/ML (ref 0.27–4.2)

## 2025-07-15 PROCEDURE — 84146 ASSAY OF PROLACTIN: CPT | Performed by: INTERNAL MEDICINE

## 2025-07-15 PROCEDURE — 99214 OFFICE O/P EST MOD 30 MIN: CPT | Performed by: INTERNAL MEDICINE

## 2025-07-15 PROCEDURE — 84439 ASSAY OF FREE THYROXINE: CPT | Performed by: INTERNAL MEDICINE

## 2025-07-15 PROCEDURE — 84443 ASSAY THYROID STIM HORMONE: CPT | Performed by: INTERNAL MEDICINE

## 2025-07-15 NOTE — PROGRESS NOTES
"Chief Complaint   Patient presents with   • Pituitary microadenoma     Follow up   • Hyperprolactinemia        HPI   Thomas Noriega is a 34 y.o. female had concerns including Pituitary microadenoma (Follow up) and Hyperprolactinemia.      Patient reports concern that her prolactin may now be further elevated.  She reports that she has had symptoms generally associated with when she would start her menstrual cycle for the past couple weeks but has not had any menstrual bleeding.  She denies any breast discharge.  Patient reports that prior dizziness has resolved.    The following portions of the patient's history were reviewed and updated as appropriate: allergies, current medications, and past social history.    Review of Systems   Genitourinary:  Positive for menstrual problem.        /72 (BP Location: Left arm, Patient Position: Sitting, Cuff Size: Adult)   Pulse 66   Ht 157.5 cm (62.01\")   Wt 76.9 kg (169 lb 9.6 oz)   SpO2 99%   BMI 31.01 kg/m²      Physical Exam      Constitutional:  well developed; well nourished  no acute distress  appears stated age   ENT/Thyroid: no thyromegaly  no distinctly palpable nodules   Eyes: Conjunctiva: clear   Respiratory:  breathing is unlabored  clear to auscultation bilaterally   Cardiovascular:  regular rate and rhythm   Chest:  Not performed.   Abdomen: Not performed.   : Not performed.   Musculoskeletal: Not performed   Skin: not performed.   Neuro: mental status, speech normal   Psych: mood and affect are within normal limits       Labs/Imaging   Latest Reference Range & Units 12/09/24 09:12 02/28/25 12:25   ACTH 7.2 - 63.3 pg/mL  13.6   Cortisol 6.2 - 19.4 ug/dL  8.6   Insulin-Like Growth Factor-1 84 - 281 ng/mL  297 (H)   LH mIU/mL  7.9   FSH mIU/mL  6.7   Prolactin 4.8 - 33.4 ng/mL  36.8 (H)   Estradiol pg/mL  29.9   TSH Baseline 0.450 - 4.500 uIU/mL  5.330 (H)   Free T4 0.82 - 1.77 ng/dL  1.00   Thyroglobulin Ab 0.0 - 0.9 IU/mL 118.8 (H)    Thyroid " Peroxidase Antibody 0 - 34 IU/mL 39 (H)    (H): Data is abnormally high    Diagnoses and all orders for this visit:    1. Pituitary microadenoma (Primary)  Most recent pituitary MRI obtained due to dizziness in March 2025 was stable in comparison to prior.  MRI from November 2023 with 5 mm microadenoma. Previous imaging in 2017 measured this at 4.5 mm.   Patient has had prior hormonal abnormalities including elevation of TSH, prolactin, IGF-I, see below.    2. Hyperprolactinemia  -     Prolactin; Future  Most recent level was mildly elevated.  Patient is not currently on medical therapy.  She reports menses have become more irregular.  Plan to repeat level today.  Discussed reinitiation of cabergoline, and dose to be determined after review of labs.    3. Subclinical hypothyroidism  -     TSH; Future  -     T4, Free; Future  Patient with mild elevation of TSH on most recent labs.  Discussed threshold for initiation of thyroid hormone.  Update levels with labs today.    4. High serum insulin-like growth factor 1 (IGF-1)  Patient had prior elevation of IGF-I which was normal on repeat evaluation at [facility]. Most recent value of 297 is reduced in comparison to prior.  Tentatively plan to monitor annually, sooner if there is a concerning symptomatic change.       Return in about 4 months (around 11/15/2025). The patient was instructed to contact the clinic with any interval questions or concerns.    Electronically signed by: Kaelyn Emerson MD   Endocrinologist    Dictated Utilizing Dragon Dictation

## 2025-08-06 ENCOUNTER — OFFICE VISIT (OUTPATIENT)
Dept: OBSTETRICS AND GYNECOLOGY | Facility: CLINIC | Age: 35
End: 2025-08-06
Payer: COMMERCIAL

## 2025-08-06 VITALS
HEIGHT: 62 IN | WEIGHT: 168 LBS | DIASTOLIC BLOOD PRESSURE: 84 MMHG | SYSTOLIC BLOOD PRESSURE: 112 MMHG | BODY MASS INDEX: 30.91 KG/M2 | RESPIRATION RATE: 18 BRPM

## 2025-08-06 DIAGNOSIS — Z01.419 WOMEN'S ANNUAL ROUTINE GYNECOLOGICAL EXAMINATION: Primary | ICD-10-CM

## 2025-08-06 DIAGNOSIS — N39.3 SUI (STRESS URINARY INCONTINENCE, FEMALE): ICD-10-CM

## (undated) DEVICE — PK C/SECT 10

## (undated) DEVICE — GLV SURG SENSICARE W/ALOE PF LF 8 STRL

## (undated) DEVICE — SUT GUT CHRM 1 CTX 36IN 905H

## (undated) DEVICE — SOL IRR H2O BTL 1000ML STRL

## (undated) DEVICE — SUT VIC 2/0 CT1 27IN J339H BX/36

## (undated) DEVICE — SUT PLAIN  3/0 CT1 27IN 842H

## (undated) DEVICE — PROXIMATE RH ROTATING HEAD SKIN STAPLERS (35 WIDE) CONTAINS 35 STAINLESS STEEL STAPLES: Brand: PROXIMATE

## (undated) DEVICE — SOL IRR NACL 0.9PCT BT 1000ML

## (undated) DEVICE — TRY SPINE BLCK WHITACRE 25G 3X5IN

## (undated) DEVICE — ANTIBACTERIAL UNDYED BRAIDED (POLYGLACTIN 910), SYNTHETIC ABSORBABLE SUTURE: Brand: COATED VICRYL

## (undated) DEVICE — MAT PREVALON MOBL TRANSFR AIR WO/PAD 39X80IN